# Patient Record
Sex: FEMALE | Race: BLACK OR AFRICAN AMERICAN | NOT HISPANIC OR LATINO | Employment: PART TIME | URBAN - METROPOLITAN AREA
[De-identification: names, ages, dates, MRNs, and addresses within clinical notes are randomized per-mention and may not be internally consistent; named-entity substitution may affect disease eponyms.]

---

## 2020-11-18 ENCOUNTER — ANCILLARY PROCEDURE (OUTPATIENT)
Dept: CARDIOLOGY | Facility: CLINIC | Age: 65
End: 2020-11-18
Attending: FAMILY MEDICINE
Payer: COMMERCIAL

## 2020-11-18 DIAGNOSIS — I48.91 ATRIAL FIBRILLATION, UNSPECIFIED TYPE (H): ICD-10-CM

## 2020-11-18 PROCEDURE — 0298T LEADLESS EKG MONITOR 3 TO 14 DAYS: CPT | Performed by: INTERNAL MEDICINE

## 2020-11-18 PROCEDURE — 93306 TTE W/DOPPLER COMPLETE: CPT | Performed by: INTERNAL MEDICINE

## 2020-11-18 PROCEDURE — 0296T LEADLESS EKG MONITOR 3 TO 14 DAYS: CPT

## 2020-11-18 NOTE — PROGRESS NOTES
Per Dr. Joyce Ernandez , patient to have 14 day monitor placed.  Diagnosis: Atrial Fib   Monitor placed: Yes  Patient Instructed: Yes  Patient verbalized understanding: Yes  Holter # [id number]  Placed :IG  Documented : IG

## 2021-09-28 ENCOUNTER — LAB REQUISITION (OUTPATIENT)
Dept: LAB | Facility: CLINIC | Age: 66
End: 2021-09-28
Payer: COMMERCIAL

## 2021-09-28 DIAGNOSIS — E11.65 TYPE 2 DIABETES MELLITUS WITH HYPERGLYCEMIA (H): ICD-10-CM

## 2021-09-28 PROCEDURE — 80061 LIPID PANEL: CPT | Mod: ORL | Performed by: FAMILY MEDICINE

## 2021-09-28 PROCEDURE — 82043 UR ALBUMIN QUANTITATIVE: CPT | Performed by: FAMILY MEDICINE

## 2021-09-29 LAB
CHOLEST SERPL-MCNC: 161 MG/DL
CREAT UR-MCNC: 11 MG/DL
FASTING STATUS PATIENT QL REPORTED: YES
HDLC SERPL-MCNC: 55 MG/DL
LDLC SERPL CALC-MCNC: 90 MG/DL
MICROALBUMIN UR-MCNC: <5 MG/L
MICROALBUMIN/CREAT UR: NORMAL MG/G{CREAT}
NONHDLC SERPL-MCNC: 106 MG/DL
TRIGL SERPL-MCNC: 80 MG/DL

## 2022-10-25 ENCOUNTER — APPOINTMENT (OUTPATIENT)
Dept: GENERAL RADIOLOGY | Facility: CLINIC | Age: 67
End: 2022-10-25
Attending: EMERGENCY MEDICINE

## 2022-10-25 ENCOUNTER — HOSPITAL ENCOUNTER (EMERGENCY)
Facility: CLINIC | Age: 67
Discharge: HOME | End: 2022-10-25
Attending: EMERGENCY MEDICINE | Admitting: EMERGENCY MEDICINE

## 2022-10-25 ENCOUNTER — APPOINTMENT (OUTPATIENT)
Dept: CARDIOLOGY | Facility: CLINIC | Age: 67
End: 2022-10-25
Attending: EMERGENCY MEDICINE

## 2022-10-25 VITALS
RESPIRATION RATE: 18 BRPM | HEART RATE: 123 BPM | OXYGEN SATURATION: 100 % | SYSTOLIC BLOOD PRESSURE: 130 MMHG | TEMPERATURE: 98.2 F | DIASTOLIC BLOOD PRESSURE: 78 MMHG

## 2022-10-25 DIAGNOSIS — I50.9 CONGESTIVE HEART FAILURE, UNSPECIFIED HF CHRONICITY, UNSPECIFIED HEART FAILURE TYPE (H): ICD-10-CM

## 2022-10-25 DIAGNOSIS — I48.0 PAROXYSMAL ATRIAL FIBRILLATION (H): ICD-10-CM

## 2022-10-25 DIAGNOSIS — I48.0 PAF (PAROXYSMAL ATRIAL FIBRILLATION) (H): Primary | ICD-10-CM

## 2022-10-25 LAB
ALBUMIN SERPL BCG-MCNC: 4.4 G/DL (ref 3.5–5.2)
ALP SERPL-CCNC: 54 U/L (ref 35–104)
ALT SERPL W P-5'-P-CCNC: 19 U/L (ref 10–35)
ANION GAP SERPL CALCULATED.3IONS-SCNC: 10 MMOL/L (ref 7–15)
AST SERPL W P-5'-P-CCNC: 18 U/L (ref 10–35)
BASOPHILS # BLD AUTO: 0.1 10E3/UL (ref 0–0.2)
BASOPHILS NFR BLD AUTO: 1 %
BILIRUB SERPL-MCNC: 0.6 MG/DL
BUN SERPL-MCNC: 17.4 MG/DL (ref 8–23)
CALCIUM SERPL-MCNC: 9.7 MG/DL (ref 8.8–10.2)
CHLORIDE SERPL-SCNC: 103 MMOL/L (ref 98–107)
CREAT SERPL-MCNC: 1.03 MG/DL (ref 0.51–0.95)
DEPRECATED HCO3 PLAS-SCNC: 26 MMOL/L (ref 22–29)
EOSINOPHIL # BLD AUTO: 0.1 10E3/UL (ref 0–0.7)
EOSINOPHIL NFR BLD AUTO: 2 %
ERYTHROCYTE [DISTWIDTH] IN BLOOD BY AUTOMATED COUNT: 14.6 % (ref 10–15)
GFR SERPL CREATININE-BSD FRML MDRD: 59 ML/MIN/1.73M2
GLUCOSE SERPL-MCNC: 174 MG/DL (ref 70–99)
HCT VFR BLD AUTO: 44.2 % (ref 35–47)
HGB BLD-MCNC: 13.8 G/DL (ref 11.7–15.7)
HOLD SPECIMEN: NORMAL
HOLD SPECIMEN: NORMAL
IMM GRANULOCYTES # BLD: 0 10E3/UL
IMM GRANULOCYTES NFR BLD: 0 %
LVEF ECHO: NORMAL
LYMPHOCYTES # BLD AUTO: 1.6 10E3/UL (ref 0.8–5.3)
LYMPHOCYTES NFR BLD AUTO: 35 %
MAGNESIUM SERPL-MCNC: 1.7 MG/DL (ref 1.7–2.3)
MCH RBC QN AUTO: 25 PG (ref 26.5–33)
MCHC RBC AUTO-ENTMCNC: 31.2 G/DL (ref 31.5–36.5)
MCV RBC AUTO: 80 FL (ref 78–100)
MONOCYTES # BLD AUTO: 0.5 10E3/UL (ref 0–1.3)
MONOCYTES NFR BLD AUTO: 10 %
NEUTROPHILS # BLD AUTO: 2.4 10E3/UL (ref 1.6–8.3)
NEUTROPHILS NFR BLD AUTO: 52 %
NRBC # BLD AUTO: 0 10E3/UL
NRBC BLD AUTO-RTO: 0 /100
NT-PROBNP SERPL-MCNC: 1294 PG/ML (ref 0–900)
PLATELET # BLD AUTO: 162 10E3/UL (ref 150–450)
POTASSIUM SERPL-SCNC: 4 MMOL/L (ref 3.4–5.3)
PROT SERPL-MCNC: 7.4 G/DL (ref 6.4–8.3)
RBC # BLD AUTO: 5.51 10E6/UL (ref 3.8–5.2)
SODIUM SERPL-SCNC: 139 MMOL/L (ref 136–145)
TROPONIN T SERPL HS-MCNC: 14 NG/L
TSH SERPL DL<=0.005 MIU/L-ACNC: 1.29 UIU/ML (ref 0.3–4.2)
WBC # BLD AUTO: 4.6 10E3/UL (ref 4–11)

## 2022-10-25 PROCEDURE — 93306 TTE W/DOPPLER COMPLETE: CPT

## 2022-10-25 PROCEDURE — 93010 ELECTROCARDIOGRAM REPORT: CPT | Performed by: EMERGENCY MEDICINE

## 2022-10-25 PROCEDURE — 93306 TTE W/DOPPLER COMPLETE: CPT | Mod: 26 | Performed by: STUDENT IN AN ORGANIZED HEALTH CARE EDUCATION/TRAINING PROGRAM

## 2022-10-25 PROCEDURE — 84443 ASSAY THYROID STIM HORMONE: CPT | Performed by: EMERGENCY MEDICINE

## 2022-10-25 PROCEDURE — 36415 COLL VENOUS BLD VENIPUNCTURE: CPT | Performed by: EMERGENCY MEDICINE

## 2022-10-25 PROCEDURE — 83735 ASSAY OF MAGNESIUM: CPT | Performed by: EMERGENCY MEDICINE

## 2022-10-25 PROCEDURE — 80053 COMPREHEN METABOLIC PANEL: CPT | Performed by: EMERGENCY MEDICINE

## 2022-10-25 PROCEDURE — 83880 ASSAY OF NATRIURETIC PEPTIDE: CPT | Performed by: EMERGENCY MEDICINE

## 2022-10-25 PROCEDURE — 71046 X-RAY EXAM CHEST 2 VIEWS: CPT

## 2022-10-25 PROCEDURE — 93005 ELECTROCARDIOGRAM TRACING: CPT | Performed by: EMERGENCY MEDICINE

## 2022-10-25 PROCEDURE — 99285 EMERGENCY DEPT VISIT HI MDM: CPT | Mod: 25 | Performed by: EMERGENCY MEDICINE

## 2022-10-25 PROCEDURE — 84484 ASSAY OF TROPONIN QUANT: CPT | Performed by: EMERGENCY MEDICINE

## 2022-10-25 PROCEDURE — 71046 X-RAY EXAM CHEST 2 VIEWS: CPT | Mod: 26 | Performed by: RADIOLOGY

## 2022-10-25 PROCEDURE — 85004 AUTOMATED DIFF WBC COUNT: CPT | Performed by: EMERGENCY MEDICINE

## 2022-10-25 RX ORDER — RIVAROXABAN 20 MG/1
TABLET, FILM COATED ORAL
Status: ON HOLD | COMMUNITY
Start: 2022-07-25 | End: 2023-03-08

## 2022-10-25 RX ORDER — METOPROLOL SUCCINATE 25 MG/1
25 TABLET, EXTENDED RELEASE ORAL DAILY
Qty: 30 TABLET | Refills: 0 | Status: SHIPPED | OUTPATIENT
Start: 2022-10-25 | End: 2023-01-05 | Stop reason: DRUGHIGH

## 2022-10-25 RX ORDER — LISINOPRIL 20 MG/1
20 TABLET ORAL DAILY
COMMUNITY
Start: 2022-06-11 | End: 2023-01-03

## 2022-10-25 RX ORDER — POTASSIUM CHLORIDE 1500 MG/1
40 TABLET, EXTENDED RELEASE ORAL
Status: CANCELLED | OUTPATIENT
Start: 2022-10-25

## 2022-10-25 RX ORDER — MAGNESIUM SULFATE HEPTAHYDRATE 40 MG/ML
2 INJECTION, SOLUTION INTRAVENOUS
Status: CANCELLED | OUTPATIENT
Start: 2022-10-25

## 2022-10-25 RX ORDER — LIDOCAINE 40 MG/G
CREAM TOPICAL
Status: CANCELLED | OUTPATIENT
Start: 2022-10-25

## 2022-10-25 RX ORDER — POTASSIUM CHLORIDE 1500 MG/1
20 TABLET, EXTENDED RELEASE ORAL
Status: CANCELLED | OUTPATIENT
Start: 2022-10-25

## 2022-10-25 ASSESSMENT — ACTIVITIES OF DAILY LIVING (ADL)
ADLS_ACUITY_SCORE: 35

## 2022-10-25 ASSESSMENT — ENCOUNTER SYMPTOMS
UNEXPECTED WEIGHT CHANGE: 0
ARTHRALGIAS: 1
SHORTNESS OF BREATH: 1
LIGHT-HEADEDNESS: 0

## 2022-10-25 NOTE — DISCHARGE INSTRUCTIONS
We will call you to arrange a cardioversion and will review the below instructions with you:    You are scheduled for a Transesophageal Echocardiogram followed by a Cardioversion at the [facility] ([address], [facility] [address], [phone]).     You will need to have a covid swab done prior to procedure.          - At-home, rapid antigen test:              - Perform within 1-2 days of procedure              - If negative, take a photo of the result or report the test result verbally on the day of procedure               - If positive, contact Vidya Rich at [phone]              - Where to find: For purchase at pharmacies, or you can order free tests at covid.gov/tests           - PCR (if you can't find or do a rapid antigen test, OR if you are staying overnight):              - Perform within 4 days of procedure                             - If being performed at an outside lab, result needs to be faxed to [phone].        Visitor Policy: Two visitors.    Follow these instructions:    1. Report to the GOLD waiting room in the [facility] hospital on: __________    2. Please do not eat anything for 8 hours prior to your procedure. You may have sips of water up until 2 hours prior to your arrival.    3. The morning of your procedure you may take your scheduled medications with a SIP of water. If you take diabetic medications or a diuretic, you may hold these.     4. You will receive medication that makes you sleepy; you will need a .    You should not make any legal decisions for 24 hours following discharge.       If you have further questions, please utilize [employer] to contact us.   If your question concerns the above instructions, contact:  Damaris Cohn RN   Electrophysiology Nurse Coordinator.  [phone]    If your question concerns the schedule/appointment times, contact:  FRITZ Stokes Procedure   [phone]      Take metoprolol as directed to treat atrial  fibrillation.    Take xarelto as prescribed. Do NOT miss any doses.    Return to the Emergency Department if you develop worsening shortness of breath, chest pain, fast heart rate, fever, coughing up blood, or fainting.

## 2022-10-25 NOTE — ED TRIAGE NOTES
Triage Assessment & Note:    BP (!) 164/105   Pulse 110   Temp 98.6  F (37  C) (Temporal)   Resp 16   SpO2 100%       Patient presents with: Pt with hx of afib comes ambulatory to triage from clinic with reports of SOB and heart palpitations.     Home Treatments/Remedies: home medications    Febrile / Afebrile: afebrile    Duration of C/o: 3 wks    Jana Morris RN  October 25, 2022

## 2022-10-25 NOTE — ED PROVIDER NOTES
ED Provider Note  [facility]      History     Chief Complaint   Patient presents with     Palpitations     Shortness of Breath     HPI  Ariana Palomino is a 67 year old female with past medical history of hypertension, hypercholesterolemia, poorly controlled type 2 diabetes and paroxysmal atrial fibrillation who presents in referral from her primary care clinic with complaint of shortness of breath and palpitations for the past 3 weeks.  Patient states she has had palpitations and shortness of breath especially with exertion or going up stairs for at least the past 3 weeks.  She takes Xarelto for paroxysmal atrial fibrillation and states that she takes this when she remembers.  She thinks she misses a few doses a month.  She denies chest pain or chest tightness.  She denies weight gain or leg swelling.  She denies lightheadedness or feeling like she is going to faint.  An EKG obtained in clinic prior to arrival shows atrial fibrillation.    Past Medical History  History reviewed. No pertinent past medical history.  History reviewed. No pertinent surgical history.  canagliflozin (INVOKANA) 100 MG tablet  metoprolol succinate ER (TOPROL XL) 25 MG 24 hr tablet  lisinopril (ZESTRIL) 20 MG tablet  metFORMIN (GLUCOPHAGE) 1000 MG tablet  XARELTO ANTICOAGULANT 20 MG TABS tablet      No Known Allergies  Family History  History reviewed. No pertinent family history.  Social History   Social History     Tobacco Use     Smoking status: Unknown   Substance Use Topics     Alcohol use: Not Currently     Drug use: Never      Past medical history, past surgical history, medications, allergies, family history, and social history were reviewed with the patient. No additional pertinent items.       Review of Systems   Constitutional: Negative for unexpected weight change.   Respiratory: Positive for shortness of breath.    Cardiovascular: Negative for chest pain and leg swelling.   Musculoskeletal: Positive for  arthralgias.   Neurological: Negative for syncope and light-headedness.   All other systems reviewed and are negative.    A complete review of systems was performed with pertinent positives and negatives noted in the HPI, and all other systems negative.    Physical Exam   BP: (!) 164/105  Pulse: 110  Temp: 98.6  F (37  C)  Resp: 16  SpO2: 100 %  Physical Exam  Vitals and nursing note reviewed.   Constitutional:       General: She is not in acute distress.     Appearance: Normal appearance. She is well-developed and well-nourished. She is obese. She is not ill-appearing or diaphoretic.   HENT:      Head: Normocephalic and atraumatic.      Nose: Nose normal.      Mouth/Throat:      Mouth: Mucous membranes are moist.   Eyes:      General: No scleral icterus.     Conjunctiva/sclera: Conjunctivae normal.   Cardiovascular:      Rate and Rhythm: Tachycardia present.   Pulmonary:      Effort: Pulmonary effort is normal. No respiratory distress.      Breath sounds: No stridor.   Abdominal:      General: There is no distension.   Musculoskeletal:         General: No deformity or signs of injury. Normal range of motion.      Cervical back: Normal range of motion and neck supple. No rigidity.      Right lower leg: No edema.      Left lower leg: No edema.   Skin:     General: Skin is warm and dry.      Coloration: Skin is not jaundiced.   Neurological:      General: No focal deficit present.      Mental Status: She is alert and oriented to person, place, and time.   Psychiatric:         Mood and Affect: Mood and affect and mood normal.         Behavior: Behavior normal.           ED Course      Procedures            EKG Interpretation:      Interpreted by Krystal Moise MD  Time reviewed: 1320  Symptoms at time of EKG: sob   Rhythm: atrial fibrillation - rapid  Rate: Tachycardia and 100-110  Axis: Normal  Ectopy: premature junctional contraction  Conduction: normal  ST Segments/ T Waves: Non-specific ST-T wave changes and T  wave inversion Lateral and Inferior  Q Waves: none  Comparison to prior: No old EKG available    Clinical Impression: atrial fibrillation                           Results for orders placed or performed during the hospital encounter of 10/25/22   XR Chest 2 Views     Status: None    Narrative    Chest 2 views    INDICATION: Short of breath    COMPARISON: None    FINDINGS: Mild cardiomegaly. Lungs and pulmonary vascularity appear  normal. Bony structures appear grossly intact.      Impression    IMPRESSION: Cardiomegaly    GROVER MORELOS MD         SYSTEM ID:  [id number]   Comprehensive metabolic panel     Status: Abnormal   Result Value Ref Range    Sodium 139 136 - 145 mmol/L    Potassium 4.0 3.4 - 5.3 mmol/L    Chloride 103 98 - 107 mmol/L    Carbon Dioxide (CO2) 26 22 - 29 mmol/L    Anion Gap 10 7 - 15 mmol/L    Urea Nitrogen 17.4 8.0 - 23.0 mg/dL    Creatinine 1.03 (H) 0.51 - 0.95 mg/dL    Calcium 9.7 8.8 - 10.2 mg/dL    Glucose 174 (H) 70 - 99 mg/dL    Alkaline Phosphatase 54 35 - 104 U/L    AST 18 10 - 35 U/L    ALT 19 10 - 35 U/L    Protein Total 7.4 6.4 - 8.3 g/dL    Albumin 4.4 3.5 - 5.2 g/dL    Bilirubin Total 0.6 <=1.2 mg/dL    GFR Estimate 59 (L) >60 mL/min/1.73m2   Troponin T, High Sensitivity     Status: Normal   Result Value Ref Range    Troponin T, High Sensitivity 14 <=14 ng/L   Magnesium     Status: Normal   Result Value Ref Range    Magnesium 1.7 1.7 - 2.3 mg/dL   TSH with free T4 reflex     Status: Normal   Result Value Ref Range    TSH 1.29 0.30 - 4.20 uIU/mL   Nt probnp inpatient (BNP)     Status: Abnormal   Result Value Ref Range    N terminal Pro BNP Inpatient 1,294 (H) 0 - 900 pg/mL   CBC with platelets and differential     Status: Abnormal   Result Value Ref Range    WBC Count 4.6 4.0 - 11.0 10e3/uL    RBC Count 5.51 (H) 3.80 - 5.20 10e6/uL    Hemoglobin 13.8 11.7 - 15.7 g/dL    Hematocrit 44.2 35.0 - 47.0 %    MCV 80 78 - 100 fL    MCH 25.0 (L) 26.5 - 33.0 pg    MCHC 31.2 (L) 31.5 -  36.5 g/dL    RDW 14.6 10.0 - 15.0 %    Platelet Count 162 150 - 450 10e3/uL    % Neutrophils 52 %    % Lymphocytes 35 %    % Monocytes 10 %    % Eosinophils 2 %    % Basophils 1 %    % Immature Granulocytes 0 %    NRBCs per 100 WBC 0 <1 /100    Absolute Neutrophils 2.4 1.6 - 8.3 10e3/uL    Absolute Lymphocytes 1.6 0.8 - 5.3 10e3/uL    Absolute Monocytes 0.5 0.0 - 1.3 10e3/uL    Absolute Eosinophils 0.1 0.0 - 0.7 10e3/uL    Absolute Basophils 0.1 0.0 - 0.2 10e3/uL    Absolute Immature Granulocytes 0.0 <=0.4 10e3/uL    Absolute NRBCs 0.0 10e3/uL   Extra Tube (Morral Draw)     Status: None    Narrative    The following orders were created for panel order Extra Tube (Morral Draw).  Procedure                               Abnormality         Status                     ---------                               -----------         ------                     Extra Blue Top Tube[[id number]]                              Final result               Extra Red Top Tube[[id number]]                               Final result                 Please view results for these tests on the individual orders.   Extra Blue Top Tube     Status: None   Result Value Ref Range    Hold Specimen JIC    Extra Red Top Tube     Status: None   Result Value Ref Range    Hold Specimen JIC    Echocardiogram Complete     Status: None   Result Value Ref Range    LVEF  50-55% (borderline)     Narrative    [id number]  [id number]  [id number]  [id number]^STEPHIE^MADDIE^DANISH     [facility]  Echocardiography Laboratory  [address] [place] [id number]     Name: YOCASTA ELAINE  MRN: [id number]  : 1955  Study Date: 10/25/2022 02:08 PM  Age: 67 yrs  Gender: Female  Patient Location: [facility]  Reason For Study: Atrial Fibrillation  Ordering Physician: MADDIE CARD  Performed By: Lidia Ramirez     BSA: 1.9 m2  Height: 64 in  Weight: 180  lb  ______________________________________________________________________________  Procedure  Complete Portable Echo Adult.  ______________________________________________________________________________  Interpretation Summary  Left ventricular function is decreased. The ejection fraction is 50-55%  (borderline).  Arrythmia and vxxh-td-dpns variation is present limiting accurate assessment  of the LV function, the LV function appears low normal.  Global right ventricular function is mildly reduced.  Severe left atrial enlargement is present.  IVC diameter and respiratory changes fall into an intermediate range  suggesting an RA pressure of 8 mmHg.     This study was compared with the study from 11/18/2020. The left ventricular  function has worsened.  ______________________________________________________________________________  Left Ventricle  Left ventricular size is normal. Arrythmia and vxio-by-vwlt variation is  present limiting accurate assessment of the LV function, the LV function  appears low normal. Left ventricular function is decreased. The ejection  fraction is 50-55% (borderline). Diastolic function not assessed due to  arrhythmia.     Right Ventricle  The right ventricle is normal size. Global right ventricular function is  mildly reduced.     Atria  Severe left atrial enlargement is present.     Mitral Valve  The mitral valve is normal. Mild mitral insufficiency is present.     Aortic Valve  Aortic valve is normal in structure and function. On Doppler interrogation,  there is no significant stenosis or regurgitation.     Tricuspid Valve  The tricuspid valve is normal. Mild tricuspid insufficiency is present. The  right ventricular systolic pressure is approximated at 22.7 mmHg plus the  right atrial pressure. Pulmonary artery systolic pressure is normal.     Pulmonic Valve  The pulmonic valve is normal.     Vessels  The aorta root is normal. The thoracic aorta is normal. IVC diameter  and  respiratory changes fall into an intermediate range suggesting an RA pressure  of 8 mmHg.     Pericardium  Trivial pericardial effusion is present.     Compared to Previous Study  This study was compared with the study from 2020 . The left ventricular  function has worsened.  ______________________________________________________________________________  MMode/2D Measurements & Calculations  IVSd: 0.98 cm  LVIDd: 4.7 cm  LVIDs: 3.4 cm  LVPWd: 1.0 cm  FS: 29.2 %  LV mass(C)d: 167.1 grams  LV mass(C)dI: 89.3 grams/m2  Ao root diam: 2.7 cm  asc Aorta Diam: 3.2 cm  LVOT diam: 2.0 cm  LVOT area: 3.1 cm2     EF(MOD-bp): 53.1 %  LA Volume (BP): 99.0 ml     LA Volume Index (BP): 52.9 ml/m2  RWT: 0.44     Doppler Measurements & Calculations  PA acc time: 0.11 sec  TR max joanna: 238.2 cm/sec  TR max P.7 mmHg     ______________________________________________________________________________  Report approved by: MD Kaveh Arellano 10/25/2022 03:16 PM         CBC with platelets differential     Status: Abnormal    Narrative    The following orders were created for panel order CBC with platelets differential.  Procedure                               Abnormality         Status                     ---------                               -----------         ------                     CBC with platelets and d...[[id number]]  Abnormal            Final result                 Please view results for these tests on the individual orders.     Medications - No data to display     Assessments & Plan (with Medical Decision Making)   Ariana Palomino is a 67 year old female with past medical history of hypertension, hypercholesterolemia, poorly controlled type 2 diabetes and paroxysmal atrial fibrillation who presents in referral from her primary care clinic with complaint of shortness of breath and palpitations for the past 3 weeks.     Ddx: Medication noncompliance, poorly controlled diabetes, new onset heart  failure, paroxysmal atrial fibrillation, persistent atrial fibrillation    Patient well-appearing and in no apparent distress.  Heart rate 110 with EKG from clinic showing atrial fibrillation.  Patient is intermittently compliant with her Xarelto and has had her current symptoms for the past 3 weeks which now includes shortness of breath in addition to the palpitations.  She does not appear volume overloaded and has no history of CHF.  Last ultrasound in the record was from 2020.  Patient is relatively stable and may be appropriate for discharge. However, would need more data on her current heart function to determine best management for her atrial fibrillation.  It does not look like she is on a rate controlling medication.  We will consult cardiology EP fellow and obtain a formal echocardiogram to help determine disposition and plan for management.     BNP elevated to 12,000, TSH normal, normal hemoglobin and white count.  Trope 14.  Creatinine 1.03.  CMP otherwise normal.  Chest x-ray showed cardiomegaly but clear lungs.  Echocardiogram showed EF of 50 to 55% which is slightly reduced compared to echo from 2020.  Global right ventricular function also mildly reduced.  Severe left atrial enlargement present.    3:47 PM electrophysiology called back after completing the consult.  They spoke with the patient about her options and she was in agreement with the following plan: Cardiology to call the patient to schedule close outpatient follow-up for an outpatient MARY and cardioversion.  They recommend she started taking metoprolol XL 25 mg daily.  I wrote a prescription for 1 month.  Cardiology also included follow-up instructions in her AVS.      Patient is comfortable with this plan for discharge and outpatient management.      I have reviewed the nursing notes. I have reviewed the findings, diagnosis, plan and need for follow up with the patient.    New Prescriptions    METOPROLOL SUCCINATE ER (TOPROL XL) 25 MG 24  HR TABLET    Take 1 tablet (25 mg) by mouth daily for 30 days       Final diagnoses:   Paroxysmal atrial fibrillation (H)   Congestive heart failure, unspecified HF chronicity, unspecified heart failure type (H)       --    [facility] EMERGENCY DEPARTMENT  10/25/2022     Krystal Moise MD  10/25/22 7501

## 2022-10-26 ENCOUNTER — HOSPITAL ENCOUNTER (OUTPATIENT)
Facility: CLINIC | Age: 67
End: 2022-10-26

## 2022-10-26 LAB
ATRIAL RATE - MUSE: 312 BPM
DIASTOLIC BLOOD PRESSURE - MUSE: NORMAL MMHG
INTERPRETATION ECG - MUSE: NORMAL
P AXIS - MUSE: NORMAL DEGREES
PR INTERVAL - MUSE: NORMAL MS
QRS DURATION - MUSE: 84 MS
QT - MUSE: 348 MS
QTC - MUSE: 459 MS
R AXIS - MUSE: 44 DEGREES
SYSTOLIC BLOOD PRESSURE - MUSE: NORMAL MMHG
T AXIS - MUSE: 186 DEGREES
VENTRICULAR RATE- MUSE: 105 BPM

## 2022-11-08 ENCOUNTER — LAB (OUTPATIENT)
Dept: LAB | Facility: CLINIC | Age: 67
End: 2022-11-08
Attending: RADIOLOGY
Payer: COMMERCIAL

## 2022-11-08 ENCOUNTER — ANESTHESIA (OUTPATIENT)
Dept: SURGERY | Facility: CLINIC | Age: 67
End: 2022-11-08
Payer: COMMERCIAL

## 2022-11-08 ENCOUNTER — HOSPITAL ENCOUNTER (OUTPATIENT)
Dept: CARDIOLOGY | Facility: CLINIC | Age: 67
Discharge: HOME | End: 2022-11-08
Attending: NURSE PRACTITIONER | Admitting: RADIOLOGY
Payer: COMMERCIAL

## 2022-11-08 ENCOUNTER — HOSPITAL ENCOUNTER (OUTPATIENT)
Facility: CLINIC | Age: 67
Discharge: HOME | End: 2022-11-08
Attending: RADIOLOGY | Admitting: RADIOLOGY
Payer: COMMERCIAL

## 2022-11-08 ENCOUNTER — ANESTHESIA EVENT (OUTPATIENT)
Dept: SURGERY | Facility: CLINIC | Age: 67
End: 2022-11-08
Payer: COMMERCIAL

## 2022-11-08 ENCOUNTER — APPOINTMENT (OUTPATIENT)
Dept: MEDSURG UNIT | Facility: CLINIC | Age: 67
End: 2022-11-08
Attending: RADIOLOGY
Payer: COMMERCIAL

## 2022-11-08 ENCOUNTER — HOSPITAL ENCOUNTER (EMERGENCY)
Facility: CLINIC | Age: 67
End: 2022-11-08
Payer: COMMERCIAL

## 2022-11-08 VITALS
DIASTOLIC BLOOD PRESSURE: 61 MMHG | SYSTOLIC BLOOD PRESSURE: 123 MMHG | OXYGEN SATURATION: 100 % | HEART RATE: 58 BPM | RESPIRATION RATE: 16 BRPM

## 2022-11-08 VITALS
OXYGEN SATURATION: 100 % | RESPIRATION RATE: 16 BRPM | SYSTOLIC BLOOD PRESSURE: 172 MMHG | DIASTOLIC BLOOD PRESSURE: 105 MMHG | HEART RATE: 125 BPM

## 2022-11-08 VITALS
DIASTOLIC BLOOD PRESSURE: 80 MMHG | HEART RATE: 52 BPM | SYSTOLIC BLOOD PRESSURE: 131 MMHG | OXYGEN SATURATION: 97 % | RESPIRATION RATE: 16 BRPM

## 2022-11-08 DIAGNOSIS — I48.0 PAF (PAROXYSMAL ATRIAL FIBRILLATION) (H): ICD-10-CM

## 2022-11-08 LAB
GLUCOSE BLDC GLUCOMTR-MCNC: 144 MG/DL (ref 70–99)
LVEF ECHO: NORMAL
MAGNESIUM SERPL-MCNC: 1.6 MG/DL (ref 1.7–2.3)
POTASSIUM SERPL-SCNC: 3.9 MMOL/L (ref 3.4–5.3)

## 2022-11-08 PROCEDURE — 93325 DOPPLER ECHO COLOR FLOW MAPG: CPT

## 2022-11-08 PROCEDURE — 84132 ASSAY OF SERUM POTASSIUM: CPT | Performed by: NURSE PRACTITIONER

## 2022-11-08 PROCEDURE — 250N000011 HC RX IP 250 OP 636: Performed by: NURSE PRACTITIONER

## 2022-11-08 PROCEDURE — 92960 CARDIOVERSION ELECTRIC EXT: CPT | Mod: GC | Performed by: INTERNAL MEDICINE

## 2022-11-08 PROCEDURE — 370N000017 HC ANESTHESIA TECHNICAL FEE, PER MIN

## 2022-11-08 PROCEDURE — 93320 DOPPLER ECHO COMPLETE: CPT | Mod: 26 | Performed by: INTERNAL MEDICINE

## 2022-11-08 PROCEDURE — 93005 ELECTROCARDIOGRAM TRACING: CPT

## 2022-11-08 PROCEDURE — 999N000054 HC STATISTIC EKG NON-CHARGEABLE

## 2022-11-08 PROCEDURE — 250N000011 HC RX IP 250 OP 636: Performed by: INTERNAL MEDICINE

## 2022-11-08 PROCEDURE — 999N000132 HC STATISTIC PP CARE STAGE 1

## 2022-11-08 PROCEDURE — 250N000009 HC RX 250: Performed by: NURSE ANESTHETIST, CERTIFIED REGISTERED

## 2022-11-08 PROCEDURE — 250N000009 HC RX 250: Performed by: INTERNAL MEDICINE

## 2022-11-08 PROCEDURE — 93325 DOPPLER ECHO COLOR FLOW MAPG: CPT | Mod: 26 | Performed by: INTERNAL MEDICINE

## 2022-11-08 PROCEDURE — 93312 ECHO TRANSESOPHAGEAL: CPT | Mod: 26 | Performed by: INTERNAL MEDICINE

## 2022-11-08 PROCEDURE — 99152 MOD SED SAME PHYS/QHP 5/>YRS: CPT | Performed by: INTERNAL MEDICINE

## 2022-11-08 PROCEDURE — 92960 CARDIOVERSION ELECTRIC EXT: CPT

## 2022-11-08 PROCEDURE — 93010 ELECTROCARDIOGRAM REPORT: CPT | Mod: 76 | Performed by: INTERNAL MEDICINE

## 2022-11-08 PROCEDURE — 83735 ASSAY OF MAGNESIUM: CPT | Performed by: NURSE PRACTITIONER

## 2022-11-08 PROCEDURE — 255N000002 HC RX 255 OP 636: Performed by: INTERNAL MEDICINE

## 2022-11-08 PROCEDURE — 82962 GLUCOSE BLOOD TEST: CPT

## 2022-11-08 PROCEDURE — 76376 3D RENDER W/INTRP POSTPROCES: CPT | Mod: 26 | Performed by: INTERNAL MEDICINE

## 2022-11-08 RX ORDER — SODIUM CHLORIDE 9 MG/ML
INJECTION, SOLUTION INTRAVENOUS CONTINUOUS PRN
Status: DISCONTINUED | OUTPATIENT
Start: 2022-11-08 | End: 2022-11-09 | Stop reason: HOSPADM

## 2022-11-08 RX ORDER — MAGNESIUM SULFATE HEPTAHYDRATE 40 MG/ML
2 INJECTION, SOLUTION INTRAVENOUS
Status: COMPLETED | OUTPATIENT
Start: 2022-11-08 | End: 2022-11-08

## 2022-11-08 RX ORDER — NALOXONE HYDROCHLORIDE 0.4 MG/ML
0.4 INJECTION, SOLUTION INTRAMUSCULAR; INTRAVENOUS; SUBCUTANEOUS
Status: DISCONTINUED | OUTPATIENT
Start: 2022-11-08 | End: 2022-11-09 | Stop reason: HOSPADM

## 2022-11-08 RX ORDER — LIDOCAINE 40 MG/G
CREAM TOPICAL
Status: DISCONTINUED | OUTPATIENT
Start: 2022-11-08 | End: 2022-11-09 | Stop reason: HOSPADM

## 2022-11-08 RX ORDER — NALOXONE HYDROCHLORIDE 0.4 MG/ML
0.2 INJECTION, SOLUTION INTRAMUSCULAR; INTRAVENOUS; SUBCUTANEOUS
Status: DISCONTINUED | OUTPATIENT
Start: 2022-11-08 | End: 2022-11-09 | Stop reason: HOSPADM

## 2022-11-08 RX ORDER — POTASSIUM CHLORIDE 750 MG/1
40 TABLET, EXTENDED RELEASE ORAL
Status: DISCONTINUED | OUTPATIENT
Start: 2022-11-08 | End: 2022-11-09 | Stop reason: HOSPADM

## 2022-11-08 RX ORDER — LIDOCAINE HYDROCHLORIDE 20 MG/ML
15 SOLUTION OROPHARYNGEAL ONCE
Status: COMPLETED | OUTPATIENT
Start: 2022-11-08 | End: 2022-11-08

## 2022-11-08 RX ORDER — FENTANYL CITRATE 50 UG/ML
25 INJECTION, SOLUTION INTRAMUSCULAR; INTRAVENOUS
Status: DISCONTINUED | OUTPATIENT
Start: 2022-11-08 | End: 2022-11-09 | Stop reason: HOSPADM

## 2022-11-08 RX ORDER — ACYCLOVIR 200 MG/1
9.5 CAPSULE ORAL
Status: DISCONTINUED | OUTPATIENT
Start: 2022-11-08 | End: 2022-11-09 | Stop reason: HOSPADM

## 2022-11-08 RX ORDER — FLUMAZENIL 0.1 MG/ML
0.2 INJECTION, SOLUTION INTRAVENOUS
Status: DISCONTINUED | OUTPATIENT
Start: 2022-11-08 | End: 2022-11-09 | Stop reason: HOSPADM

## 2022-11-08 RX ORDER — POTASSIUM CHLORIDE 750 MG/1
20 TABLET, EXTENDED RELEASE ORAL
Status: DISCONTINUED | OUTPATIENT
Start: 2022-11-08 | End: 2022-11-09 | Stop reason: HOSPADM

## 2022-11-08 RX ADMIN — TOPICAL ANESTHETIC 0.5 ML: 200 SPRAY DENTAL; PERIODONTAL at 10:33

## 2022-11-08 RX ADMIN — MIDAZOLAM 2 MG: 1 INJECTION INTRAMUSCULAR; INTRAVENOUS at 10:42

## 2022-11-08 RX ADMIN — MAGNESIUM SULFATE IN WATER 2 G: 40 INJECTION, SOLUTION INTRAVENOUS at 11:01

## 2022-11-08 RX ADMIN — FENTANYL CITRATE 50 MCG: 50 INJECTION, SOLUTION INTRAMUSCULAR; INTRAVENOUS at 10:43

## 2022-11-08 RX ADMIN — LIDOCAINE HYDROCHLORIDE 30 MG: 10 INJECTION, SOLUTION EPIDURAL; INFILTRATION; INTRACAUDAL; PERINEURAL at 11:36

## 2022-11-08 RX ADMIN — LIDOCAINE HYDROCHLORIDE 15 ML: 20 SOLUTION ORAL at 10:33

## 2022-11-08 RX ADMIN — PERFLUTREN 6 ML: 6.52 INJECTION, SUSPENSION INTRAVENOUS at 11:05

## 2022-11-08 ASSESSMENT — ACTIVITIES OF DAILY LIVING (ADL)
ADLS_ACUITY_SCORE: 35
ADLS_ACUITY_SCORE: 35

## 2022-11-08 ASSESSMENT — ENCOUNTER SYMPTOMS: DYSRHYTHMIAS: 1

## 2022-11-08 NOTE — PROGRESS NOTES
Pt discharged via wheelchair accompanied by RN and daughter. VSS. Denies pain. Discharge instructions reviewed with pt and daughter. Pt tolerated po intake, ambulated in hallway and voided.

## 2022-11-08 NOTE — DISCHARGE INSTRUCTIONS
GOING HOME AFTER YOUR TRANSESOPHAGEAL ECHOCARDIOGRAM AND CARDIOVERSION    FOR NEXT 24 HOURS:  An adult should stay with you.  Relax and take it easy.  DO NOT make any important legal decisions.  DO NOT drive or operate machines at home or at work.  DO NOT consume any alcohol today.  Resume your regular diet 2 HOURS after procedure @ __________ and drink plenty of fluids.  If your throat is sore, eat cold, bland, soft foods.  If you have any redness/skin sorness where the patches were placed, you may use aloe vera gel or 1% hydrocortisone cream to the skin (sold at drug stores)  Take Tylenol (Acetaminophen) per your provider's recommendations as needed to help relieve local pain.     CALL YOUR HEALTHCARE PROVIDER IF:  New pain or trouble swallowing  New stomach or chest pain  You develop nausea or vomiting  Fever above 100.6 F or greater  You develop hives or a rash or any unexplained itching  Have irregular heartbeat or fast pulse  Feel faint, dizzy, or lightheaded  Have chest pain with increased activity  Have bleeding issues from blood-thinning medicines (vomiting that looks like coffee grounds or contains blood OR black, bloody stools).    CALL 911 RIGHT AWAY IF YOU HAVE:  Pain in your chest, arm, shoulder, neck, or upper back  Shortness of breath  Loss of vision, speech, or strength or coordination in any body part  Weakness in your arm or leg  Uncontrolled bleeding  Feel unstable in any way     FOLLOW UP APPOINTMENT:    Follow up as scheduled with EP/Cardiology Provider     ADDITIONAL INFORMATION:    If you have any questions:        Call the Echo Lab Nurse at [phone], press 4 (Monday-Friday 7:00 am - 4:00 pm)        Call the hospital: [phone] and ask them to page [phone] (after 4:00 pm and on   weekends or holidays)      Cardiovascular Clinic:   [address] [place] [address]. [place] [address]  Your Care Team:  EP Cardiology   Telephone Number     Polina Ellis NP [phone]   Damaris Cohn  RN  Maxine Dos Santos RN  [phone]     For scheduling appts or procedures:    Vidya Rich   [phone]   For the Device Clinic (Pacemakers and ICD's)   RN's :   Angela Thurman  During business hours: [phone]     After business hours:   [phone]- select option 4 and ask for job code [phone].       As always, Thank you for trusting us with your health care needs!

## 2022-11-08 NOTE — PROGRESS NOTES
Polina Ellis from EP made aware of pt's HR dipping down in low 40s. Per Polina, pt to stop taking her metoprolol. Pt and daughter both made aware.

## 2022-11-08 NOTE — ANESTHESIA PREPROCEDURE EVALUATION
Anesthesia Pre-Procedure Evaluation    Patient: Ariana Palomino   MRN: [id number] : 1955        Procedure : Procedure(s):  ANESTHESIA, FOR CARDIOVERSION@1130          No past medical history on file.   No past surgical history on file.   No Known Allergies   Social History     Tobacco Use     Smoking status: Unknown     Smokeless tobacco: Not on file   Substance Use Topics     Alcohol use: Not Currently      Wt Readings from Last 1 Encounters:   No data found for Wt        Anesthesia Evaluation            ROS/MED HX  ENT/Pulmonary:       Neurologic:       Cardiovascular: Comment: Echo 10/25/2022    Interpretation Summary  Left ventricular function is decreased. The ejection fraction is 50-55%  (borderline).  Arrythmia and gvgu-gf-mvve variation is present limiting accurate assessment  of the LV function, the LV function appears low normal.  Global right ventricular function is mildly reduced.  Severe left atrial enlargement is present.  IVC diameter and respiratory changes fall into an intermediate range  suggesting an RA pressure of 8 mmHg.     This study was compared with the study from 2020. The left ventricular  function has worsened.    (+) Dyslipidemia hypertension-----dysrhythmias, a-fib, valvular problems/murmurs     METS/Exercise Tolerance:     Hematologic:       Musculoskeletal:       GI/Hepatic:       Renal/Genitourinary:       Endo:     (+) type II DM,     Psychiatric/Substance Use:       Infectious Disease:       Malignancy:       Other:               OUTSIDE LABS:  CBC:   Lab Results   Component Value Date    WBC 4.6 10/25/2022    HGB 13.8 10/25/2022    HCT 44.2 10/25/2022     10/25/2022     BMP:   Lab Results   Component Value Date     10/25/2022    POTASSIUM 4.0 10/25/2022    CHLORIDE 103 10/25/2022    CO2 26 10/25/2022    BUN 17.4 10/25/2022    CR 1.03 (H) 10/25/2022     (H) 10/25/2022     COAGS: No results found for: PTT, INR, FIBR  POC: No results found for: BGM, HCG,  HCGS  HEPATIC:   Lab Results   Component Value Date    ALBUMIN 4.4 10/25/2022    PROTTOTAL 7.4 10/25/2022    ALT 19 10/25/2022    AST 18 10/25/2022    ALKPHOS 54 10/25/2022    BILITOTAL 0.6 10/25/2022     OTHER:   Lab Results   Component Value Date    MCKAYLA 9.7 10/25/2022    MAG 1.7 10/25/2022    TSH 1.29 10/25/2022       Anesthesia Plan    ASA Status:  4   NPO Status:  NPO Appropriate    Anesthesia Type: General.     - Airway: Native airway   Induction: Intravenous.   Maintenance: Inhalation.        Consents    Anesthesia Plan(s) and associated risks, benefits, and realistic alternatives discussed. Questions answered and patient/representative(s) expressed understanding.    - Discussed:     - Discussed with:  Patient      - Extended Intubation/Ventilatory Support Discussed: Yes.      - Patient is DNR/DNI Status: No    Use of blood products discussed: Yes.     - Discussed with: Patient.     Postoperative Care    Pain management: IV analgesics.        Comments:                Christopher J. Behrens, MD

## 2022-11-08 NOTE — ANESTHESIA POSTPROCEDURE EVALUATION
Patient: Ariana Palomino    Procedure: Procedure(s):  ANESTHESIA, FOR CARDIOVERSION@1130       Anesthesia Type:  General    Note:  Disposition: Outpatient   Postop Pain Control: Uneventful            Sign Out: Well controlled pain   PONV: No   Neuro/Psych: Uneventful            Sign Out: Acceptable/Baseline neuro status   Airway/Respiratory: Uneventful            Sign Out: Acceptable/Baseline resp. status   CV/Hemodynamics: Uneventful            Sign Out: Acceptable CV status   Other NRE: NONE   DID A NON-ROUTINE EVENT OCCUR? No           Last vitals:  Vitals:    11/08/22 1215 11/08/22 1230 11/08/22 1245   BP: 121/80 127/66 123/61   Pulse: 65 (!) 46 58   Resp: 16 16 16   SpO2: 98% 100%        Electronically Signed By: Christopher J. Behrens, MD  November 8, 2022  12:55 PM

## 2022-11-08 NOTE — PROGRESS NOTES
Pt arrived in ECHO department  for scheduled MARY + DCCV.   Procedure explained, questions answered and consent signed. Discharge instructions discussed with patient.  Pt's throat sprayed at 1030, therefore pt will not be able to eat or drink until 2 hours after at 1230.  Informed pt of this time and encouraged to start with warm fluids and soft foods.    Pt tolerated procedure well, and was given a total of 50 mcg IV fentanyl and 2 mg IV versed for conscious sedation.  Pt denied throat or chest pain after MARY complete.   MARY probe 62 used for procedure.  No clots visualized on MARY so proceeded with DCCV.  Anesthesia gave pt 30 mg IV brevitol for sedation and pt was DCCV at 150 Joules to a SR/SB.  Patient monitored closely until awake and VSS. Patient transferred to 2A for recovery.   Report given to 2A RN.

## 2022-11-08 NOTE — DISCHARGE INSTRUCTIONS
GOING HOME AFTER YOUR CARDIOVERSION    FOR NEXT 24 HOURS:  An adult should stay with you.  Relax and take it easy.  DO NOT make any important legal decisions.  DO NOT drive or operate machines at home or at work.  Resume your regular diet and drink plenty of fluids.  If you have any redness/skin sorness where the patches were placed, you may use aloe vera gel or 1% hydrocortisone cream to the skin (sold at drug stores)  Take Tylenol (Acetaminophen) per your provider's recommendations as needed to help relieve local pain.     CALL YOUR HEALTHCARE PROVIDER IF:  You develop nausea or vomiting  You develop hives or a rash or any unexplained itching  Have irregular heartbeat or fast pulse  Feel faint, dizzy, or lightheaded  Have chest pain with increased activity  Have bleeding issues from blood-thinning medicines    CALL 911 RIGHT AWAY IF YOU HAVE:  Pain in your chest, arm, shoulder, neck, or upper back  Shortness of breath  Loss of vision, speech, or strength or coordination in any body part  Weakness in your arm or leg  Uncontrolled bleeding  Feel unstable in any way     FOLLOW UP APPOINTMENT:    Follow up as scheduled with EP/Cardiology Provider in 4 weeks    ADDITIONAL INFORMATION:    Special instruction: Stop taking metoprolol due to lower heart rate after your cardioversion.     Cardiovascular Clinic:   [address] [place] [address]. [place] [address]  Your Care Team:  EP Cardiology   Telephone Number     Polina Ellis NP [phone]   Damaris Dos Santos RN  [phone]     For scheduling appts or procedures:    Vidya Rich   [phone]   For the Device Clinic (Pacemakers and ICD's)   RN's :   Angela Thurman  During business hours: [phone]     After business hours:   [phone]- select option 4 and ask for job code [id number].       As always, Thank you for trusting us with your health care needs!

## 2022-11-08 NOTE — ANESTHESIA CARE TRANSFER NOTE
Patient: Ariana Palomino    Procedure: Procedure(s):  ANESTHESIA, FOR CARDIOVERSION@1130       Diagnosis: Paroxysmal atrial fibrillation (H) [I48.0]  Diagnosis Additional Information: No value filed.    Anesthesia Type:   General     Note:    Oropharynx: oropharynx clear of all foreign objects and spontaneously breathing  Level of Consciousness: awake  Oxygen Supplementation: nasal cannula  Level of Supplemental Oxygen (L/min / FiO2): 4  Independent Airway: airway patency satisfactory and stable  Dentition: dentition unchanged  Vital Signs Stable: post-procedure vital signs reviewed and stable  Report to RN Given: handoff report given  Destination: echo.  Comments: VSS, converted to SR, report given to RN.         Vitals:  Vitals Value Taken Time   /83    Temp     Pulse 50    Resp 14    SpO2 100        Electronically Signed By: MARK Leigh CRNA  November 8, 2022  11:40 AM

## 2022-11-08 NOTE — PROCEDURES
[facility]    Procedure: *Cardioversion    Date/Time: 11/8/2022 2:15 PM  Performed by: Gian Zuniga MD  Authorized by: Gian Zuniga MD       UNIVERSAL PROTOCOL   Site Marked: NA  Prior Images Obtained and Reviewed:  Yes  Required items: Required blood products, implants, devices and special equipment available    Patient identity confirmed:  Arm band, verbally with patient, provided demographic data and hospital-assigned identification number  Patient was reevaluated immediately before administering moderate or deep sedation or anesthesia  Confirmation Checklist:  Patient's identity using two indicators, relevant allergies, procedure was appropriate and matched the consent or emergent situation and correct equipment/implants were available  Time out: Immediately prior to the procedure a time out was called    Universal Protocol: the Joint Commission Universal Protocol was followed    Preparation: Patient was prepped and draped in usual sterile fashion       ANESTHESIA  Anesthesia was administered and monitored by anesthesiology.  See anesthesia documentation for details.    SEDATION  Patient Sedated: Yes    Vital signs: Vital signs monitored during sedation      PROCEDURE DETAILS  Cardioversion basis: elective  Pre-procedure rhythm: atrial fibrillation  Patient position: patient was placed in a supine position  Chest area: chest area exposed  Electrodes: pads  Electrodes placed: anterior-posterior  Number of attempts: 1    Details of Attempts:  150 J D C synchronized current was delivered for cardioversion   Post-procedure rhythm: normal sinus rhythm  Complications: no complications      PROCEDURE    Patient Tolerance:  Patient tolerated the procedure well with no immediate complications      ATTENDING ATTESTATION:  I was present for the entire procedure and agree with the above note.     Tiffanie Rodriguez MD, MS  Professor of Medicine  Cardiovascular Division

## 2022-11-08 NOTE — PROGRESS NOTES
Pt arrived on 2A, room # 6 after MARY/cardioversion. Alert and oriented. VSS, mayank (HR in low 40s-60s). Denies pain. Can start po at 12:30p. Daughter arrived at bedside.

## 2022-11-09 LAB
ATRIAL RATE - MUSE: 46 BPM
ATRIAL RATE - MUSE: 98 BPM
DIASTOLIC BLOOD PRESSURE - MUSE: NORMAL MMHG
DIASTOLIC BLOOD PRESSURE - MUSE: NORMAL MMHG
INTERPRETATION ECG - MUSE: NORMAL
INTERPRETATION ECG - MUSE: NORMAL
P AXIS - MUSE: NORMAL DEGREES
P AXIS - MUSE: NORMAL DEGREES
PR INTERVAL - MUSE: 158 MS
PR INTERVAL - MUSE: NORMAL MS
QRS DURATION - MUSE: 84 MS
QRS DURATION - MUSE: 92 MS
QT - MUSE: 332 MS
QT - MUSE: 502 MS
QTC - MUSE: 439 MS
QTC - MUSE: 484 MS
R AXIS - MUSE: 62 DEGREES
R AXIS - MUSE: 78 DEGREES
SYSTOLIC BLOOD PRESSURE - MUSE: NORMAL MMHG
SYSTOLIC BLOOD PRESSURE - MUSE: NORMAL MMHG
T AXIS - MUSE: 196 DEGREES
T AXIS - MUSE: 244 DEGREES
VENTRICULAR RATE- MUSE: 128 BPM
VENTRICULAR RATE- MUSE: 46 BPM

## 2023-01-03 ENCOUNTER — OFFICE VISIT (OUTPATIENT)
Dept: CARDIOLOGY | Facility: CLINIC | Age: 68
End: 2023-01-03
Payer: COMMERCIAL

## 2023-01-03 VITALS
DIASTOLIC BLOOD PRESSURE: 120 MMHG | SYSTOLIC BLOOD PRESSURE: 167 MMHG | WEIGHT: 204.8 LBS | HEART RATE: 137 BPM | OXYGEN SATURATION: 98 %

## 2023-01-03 DIAGNOSIS — I48.19 PERSISTENT ATRIAL FIBRILLATION (H): ICD-10-CM

## 2023-01-03 DIAGNOSIS — E11.65 UNCONTROLLED TYPE 2 DIABETES MELLITUS WITH HYPERGLYCEMIA (H): ICD-10-CM

## 2023-01-03 DIAGNOSIS — I10 BENIGN ESSENTIAL HYPERTENSION: Primary | ICD-10-CM

## 2023-01-03 PROCEDURE — 93000 ELECTROCARDIOGRAM COMPLETE: CPT | Performed by: INTERNAL MEDICINE

## 2023-01-03 PROCEDURE — 99205 OFFICE O/P NEW HI 60 MIN: CPT | Performed by: INTERNAL MEDICINE

## 2023-01-03 RX ORDER — LISINOPRIL 20 MG/1
20 TABLET ORAL 2 TIMES DAILY
Qty: 60 TABLET | Refills: 3 | Status: SHIPPED | OUTPATIENT
Start: 2023-01-03 | End: 2023-01-05 | Stop reason: DRUGHIGH

## 2023-01-03 RX ORDER — METOPROLOL SUCCINATE 25 MG/1
50 TABLET, EXTENDED RELEASE ORAL DAILY
Qty: 30 TABLET | Refills: 3 | Status: ON HOLD | OUTPATIENT
Start: 2023-01-03 | End: 2023-03-08

## 2023-01-03 NOTE — PROGRESS NOTES
I am delighted to see Ariana Palomino as a new patient in [facility] cardiology clinic for evaluation of atrial fibrillation.     History of Present Illness:  The patient is a 67 year old  Female with a h/o PAF who presented to ED 10/25/2022 with 3 weeks of palpitations and dyspnea. Found to be in AF with rates 110 bpm and Bp 164/105.  She was intermittently compliant with Xarelto, so was started on metoprolol XL 25 mg every day, encouraged to take Xarelto daily, and scheduled for outpatient MARY/CV 11/8/2022 to sinus rhythm. She said it lasted a day before she had recurrent palpitations and dyspnea with climbing stairs. She did not report to her PCP. She was told to stop metoprolol after the CV due to sinus mayank in the 40s.  She said she's been on metoprolol previously up to 50 bid and tolerated it well.      Past Medical History:  1. Atrial fibrillation, initially presented 11/3/2020 in setting of COVID+ with symptoms, spontaneously converted to sinus. Recurrent AF s/p MARY/CV 11/8/2022  2. Hypertension - her lisinopril was increased from 20 to 40 in Aug 2022 per PCP note, but she's still only taking 20 every day.  3. Diabetes type II - Invokana added in July 2022 but patient states she does not take it     Medications:   Xarelto 20 every day  Lisinopril 20 every day  Metformin 1000 bid    Allergies:  No Known Allergies    Physical examination  Vitals: 167/120,   Weight 92.9 kg    Constitutional: In general, the patient is in no acute distress. Appears tired and older than stated age.   Cardiovascular: Carotids +2/2 bilaterally without bruits.  No jugular venous distension. Irregularly irregular. Normal S1, S2. No murmur, rub, click, or gallop.   Extremities: Pulses are normal bilaterally throughout. Trace peripheral edema.  Respiratory: Clear to asculation.  No ronchi, wheezes, rales.  No dullness to percussion.     I have personally and independently reviewed the following:  Labs:   10/25/2022: BNP 1294, TSH  1.29, cr 1.03, K 4.0. hgb 13, plt 162K, A1C 8.2  9/28/2021: chol 161, LDL 90, TG 80, HDL 55    Echo 10/25/2022 (in AF): EF 50-55%, RA 8, no sig valve disease, AZEEM 52.9    EKG:   TODAY: AF   11/8/2022:  bpm (pre CV)  11/8/2022: sinus mayank 46 bpm with PACs (post CV)    Patch monitor 11/18-12/2/2020: sinus average 62 bpm, no AF    Assessment :  1. Recurrent atrial fibrillation, persistent, with RVR, not on any winsome blocking agents. Her AZEEM is 52.9, would be difficult to maintain sinus rhythm without antiarrhythmic drugs. Not sure about her medical compliance at this time, so I am reluctant to start AAD. Would attempt rate control - will start metoprolol succinate 50 every day. [id number] score is 4 for age, female, HTN, DM. On appropriate dose of Xarelto 20 every day.  2. Hypertension. Not controlled. Advised her to increase lisinopril to 20 bid as previously directed by her PCP.  3. Diabetes type II. A1C 8.2, she is not taking Invokana.    Plan:  I would like to see patient back next week to assess rate control, BP. Will also repeat BMP on higher dose of lisinopril.  I favor aggressive attempts at rate control at this time rather than antiarrhythmic drugs given her medical compliance issues.        I spent a total of 30 minutes face to face with  Ariana Palomino during today's office visit. I have spend an additional 30 minutes today on chart review and documentation.      The patient is to return as above . The patient understood the treatment plan as outlined above.  There were no barriers to learning.      Kristina Arizmendi MD

## 2023-01-03 NOTE — PATIENT INSTRUCTIONS
The following is a summary of your office visit today:    Atrial fibrillation    Begin Metoprolol succinate 50 mg once a day  Increase lisinopril to 20 mg twice a day  Keep taking Xarelto    Return next week with [facility]    Nurse contact information:  Cardiology Care Coordinators:  Cheryl Castillo RN and Giuliana Kaye, RN   Phone  [phone]    Fax [phone]       HOW TO CHECK YOUR BLOOD PRESSURE AT HOME:     Avoid eating, smoking, and exercising for at least 30 minutes before taking a reading.    Be sure you have taken your BP medication at least 2-3 hours before you check it.     Sit quietly for 10 minutes before a reading.     Sit in a chair with your feet flat on the floor. Rest your  arm on a table so that the arm cuff is at the same level as your heart.    Remain still during the reading.    Record your blood pressure and pulse in a log and bring to your next appointment.     If you have had any blood work, imaging or other testing completed we will be in touch within 1-2 weeks regarding the results. If you have any questions, concerns or need to schedule a follow up, please contact us at [phone]. If you are needing refills please contact your pharmacy. For urgent after hour care please call the [facility] at [phone] or the [facility] at [phone] and ask to speak to the cardiologist on call.    It was a pleasure meeting with you today. Please let us know if there is anything else we can do for you so that we can be sure you are leaving completely satisfied with your care experience.     Your Cardiology Team at [facility]  RN Care Coordinators: Nirali  Support Staff: Tosin

## 2023-01-03 NOTE — NURSING NOTE
Ariana Palomino's goals for this visit include:   Chief Complaint   Patient presents with     New Patient     Atrial Fib       She requests these members of her care team be copied on today's visit information: yes     PCP: Oma Cook    Referring Provider:  No referring provider defined for this encounter.    BP (!) 167/120 (BP Location: Left arm, Patient Position: Chair, Cuff Size: Adult Large)   Pulse (!) 137   Wt 92.9 kg (204 lb 12.8 oz)   SpO2 98%     Do you need any medication refills at today's visit? No         ZIGGY Ferguson   Cardiology Team  [facility]

## 2023-01-04 ENCOUNTER — TELEPHONE (OUTPATIENT)
Dept: CARDIOLOGY | Facility: CLINIC | Age: 68
End: 2023-01-04

## 2023-01-04 DIAGNOSIS — I48.0 PAROXYSMAL ATRIAL FIBRILLATION (H): ICD-10-CM

## 2023-01-04 DIAGNOSIS — I10 ESSENTIAL HYPERTENSION: Primary | ICD-10-CM

## 2023-01-04 NOTE — TELEPHONE ENCOUNTER
[facility] Call Center    Phone Message    May a detailed message be left on voicemail: yes     Reason for Call: Medication Question or concern regarding medication   Prescription Clarification  Name of Medication: lisinopril (ZESTRIL) 20 MG tablet  metoprolol succinate ER (TOPROL XL) 25 MG 24 hr tablet    Prescribing Provider: Dr Arizmendi   Pharmacy: [id number] [place] [facility] [address]      What on the order needs clarification? The insurance will only cover 1 pill a day, so Dr Arizmendi could send a higher dose or do a Prior Auth for both medications for the 2 pills a day       Action Taken: Other: Cardiology    Travel Screening: Not Applicable     Thank you!  [facility]

## 2023-01-05 RX ORDER — LISINOPRIL 40 MG/1
20 TABLET ORAL 2 TIMES DAILY
Qty: 90 TABLET | Refills: 3 | Status: ON HOLD | OUTPATIENT
Start: 2023-01-05 | End: 2023-03-08

## 2023-01-05 RX ORDER — METOPROLOL SUCCINATE 50 MG/1
50 TABLET, EXTENDED RELEASE ORAL DAILY
Qty: 90 TABLET | Refills: 3 | Status: SHIPPED | OUTPATIENT
Start: 2023-01-05 | End: 2023-01-10

## 2023-01-05 NOTE — TELEPHONE ENCOUNTER
Begin Metoprolol succinate 50 mg once a day  Increase lisinopril to 20 mg twice a day    Insurance not covering new directions sent by Dr Arizmendi on 1/3/23.  Changed metoprolol to a 50 mg tablet instead of two 20 mg tablets and changed lisinopril to 40 mg tablet for patient to cut in half and take 20 mg BID.   Sent to pharmacy.     Cheryl Castillo RN  Cardiology Care Coordinator  [facility]  Phone: [phone]

## 2023-01-06 ENCOUNTER — DOCUMENTATION ONLY (OUTPATIENT)
Dept: LAB | Facility: CLINIC | Age: 68
End: 2023-01-06

## 2023-01-06 DIAGNOSIS — I10 BENIGN ESSENTIAL HYPERTENSION: Primary | ICD-10-CM

## 2023-01-06 NOTE — PROGRESS NOTES
Ariana Palomino has an upcoming lab appointment:    Future Appointments   Date Time Provider Department Center   1/10/2023 10:40 AM [facility]   1/10/2023 11:00 AM [facility]     Patient is scheduled for the following lab(s): Dr. Arizmendi lab orders     There is no order available. Please review and place either future orders or HMPO (Review of Health Maintenance Protocol Orders), as appropriate.    Health Maintenance Due   Topic     A1C      ANNUAL REVIEW OF HM ORDERS      HEPATITIS C SCREENING      LIPID      MICROALBUMIN      Cielo Sauer

## 2023-01-10 ENCOUNTER — APPOINTMENT (OUTPATIENT)
Dept: LAB | Facility: CLINIC | Age: 68
End: 2023-01-10
Payer: COMMERCIAL

## 2023-01-10 ENCOUNTER — OFFICE VISIT (OUTPATIENT)
Dept: CARDIOLOGY | Facility: CLINIC | Age: 68
End: 2023-01-10
Payer: COMMERCIAL

## 2023-01-10 VITALS
DIASTOLIC BLOOD PRESSURE: 90 MMHG | OXYGEN SATURATION: 100 % | BODY MASS INDEX: 32.47 KG/M2 | WEIGHT: 202 LBS | HEART RATE: 90 BPM | HEIGHT: 66 IN | SYSTOLIC BLOOD PRESSURE: 114 MMHG

## 2023-01-10 DIAGNOSIS — I10 BENIGN ESSENTIAL HYPERTENSION: ICD-10-CM

## 2023-01-10 DIAGNOSIS — I48.0 PAROXYSMAL ATRIAL FIBRILLATION (H): ICD-10-CM

## 2023-01-10 LAB
ANION GAP SERPL CALCULATED.3IONS-SCNC: 3 MMOL/L (ref 3–14)
BUN SERPL-MCNC: 11 MG/DL (ref 7–30)
CALCIUM SERPL-MCNC: 9.2 MG/DL (ref 8.5–10.1)
CHLORIDE BLD-SCNC: 111 MMOL/L (ref 94–109)
CO2 SERPL-SCNC: 29 MMOL/L (ref 20–32)
CREAT SERPL-MCNC: 0.92 MG/DL (ref 0.52–1.04)
GFR SERPL CREATININE-BSD FRML MDRD: 67 ML/MIN/1.73M2
GLUCOSE BLD-MCNC: 222 MG/DL (ref 70–99)
POTASSIUM BLD-SCNC: 4.3 MMOL/L (ref 3.4–5.3)
SODIUM SERPL-SCNC: 143 MMOL/L (ref 133–144)

## 2023-01-10 PROCEDURE — 93000 ELECTROCARDIOGRAM COMPLETE: CPT | Performed by: INTERNAL MEDICINE

## 2023-01-10 PROCEDURE — 99207 PR SC NO CHARGE VISIT: CPT

## 2023-01-10 PROCEDURE — 36415 COLL VENOUS BLD VENIPUNCTURE: CPT | Performed by: INTERNAL MEDICINE

## 2023-01-10 PROCEDURE — 80048 BASIC METABOLIC PNL TOTAL CA: CPT | Performed by: INTERNAL MEDICINE

## 2023-01-10 PROCEDURE — 99215 OFFICE O/P EST HI 40 MIN: CPT | Mod: GC

## 2023-01-10 RX ORDER — METOPROLOL SUCCINATE 50 MG/1
100 TABLET, EXTENDED RELEASE ORAL DAILY
Qty: 90 TABLET | Refills: 3 | Status: ON HOLD | OUTPATIENT
Start: 2023-01-10 | End: 2023-03-06

## 2023-01-10 NOTE — NURSING NOTE
"Ariana Palomino's goals for this visit include: 1 week follow up  She requests these members of her care team be copied on today's visit information: NA    PCP: Oma Cook    Referring Provider:  No referring provider defined for this encounter.    BP (!) 114/90   Pulse 90   Ht 1.676 m (5' 6\")   Wt 91.6 kg (202 lb)   SpO2 100%   BMI 32.60 kg/m      Do you need any medication refills at today's visit? NO    Lien Ellsworth CMA (AAMA)      "

## 2023-01-10 NOTE — PROGRESS NOTES
ELECTROPHYSIOLOGY CLINIC VISIT    Assessment/Recommendations   Assessment/Plan:    Ariana Palomino is a 67 year old female who presents today for follow up of persistent AF with RVR and HTN.     Persistent Atrial Fibrillation with RVR   Started metoprolol succinate 50 mg last visit. AF vent rate today 115 (130 bpm previously). Increased metoprolol succinate to 50 mg BID. Will see patient in 1 month. If HR at that time is well controlled and pt feels well, we will update an echo to ensure her EF is remaining stable. If she is feeling tired, or HR is not well controlled will consider hospital admission for AAT with cardioversion. Given history of sinus bradycardia, would favor Dofetilide at that time.     Hypertension  Increased lisinopril to 20 mg BID as previously directed by PCP. BP today 114/90, upon retake 128/83. Updated BMP today, stable kidney function with increased dose. Will increase metoprolol succinate to 50 mg BID.    Diabetes type II   Invokana added in July 2022, pt reports she is not taking d/t some concerns she has about starting it. Encouraged follow up with PCP to discuss medication or other options.     Follow up in 1 month.        History of Present Illness/Subjective    MsChasity Palomino is a 67 year old female who comes in today for follow up of HTN and persistent AF.     The patient is a 67 year old  Female with a h/o PAF who presented to ED 10/25/2022 with 3 weeks of palpitations and dyspnea. Found to be in AF with rates 110 bpm and Bp 164/105.  She was intermittently compliant with Xarelto, so was started on metoprolol XL 25 mg every day, encouraged to take Xarelto daily, and scheduled for outpatient MARY/CV 11/8/2022 to sinus rhythm. She said it lasted a day before she had recurrent palpitations and dyspnea with climbing stairs. She did not report to her PCP. She was told to stop metoprolol after the CV due to sinus mayank in the 40s.  She said she's been on metoprolol previously up to 50 bid  and tolerated it well.    Last visit (1/3), presenting EKG with AF (130 bpm) and /120, lisinopril increased to 20 mg BID and started metoprolol succinate 50 mg daily.     She presents a week later for follow up. BP today 114/90, retake 128/83. EKG with AF rate of 115 bpm. States she did not start taking the metoprolol succinate until Friday (1/6). Reports she is taking the 40 mg of lisinopril at once before bed instead of BID. States she does feel better since starting metoprolol and increasing the lisinopril. She does endorse feeling tired, reports there was maybe slight improvement of this following the cardioversion when she was in SR for ~1 day. She denies chest discomfort, orthopnea, lightheadedness, dizziness, pre-syncope, or syncope.     I have reviewed and updated the patient's Past Medical History, Social History, Family History and Medication List.     Cardiographics (Personally Reviewed) :   MARY: 11/8/22   Interpretation Summary  Left ventricular function is decreased. The ejection fraction is 30-35%  (moderately reduced).  Global right ventricular function is mildly reduced.  Severe biatrial enlargement is present.   There is significant and organized spontaneous echo contrast in left atrial  appendage with a definitive thrombus. Doppler velocities are low at 20cm/sec.  The FLAVIO was thoroughly interogated with contrast and 3D.  No significant valvular abnoramlities are present.  Trivial pericardial effusion is present.    TTE: 10/25/22  nterpretation Summary  Left ventricular function is decreased. The ejection fraction is 50-55%  (borderline).  Arrythmia and hoye-cm-wbew variation is present limiting accurate assessment  of the LV function, the LV function appears low normal.  Global right ventricular function is mildly reduced.  Severe left atrial enlargement is present.  IVC diameter and respiratory changes fall into an intermediate range  suggesting an RA pressure of 8 mmHg.  LA Volume Index  "(BP): 52.9 ml/m2    EK/10/2023:  bpm   1/3/2023:  bpm   2022:  bpm (pre CV)  2022: sinus mayank 46 bpm with PACs (post CV)        Physical Examination   BP (!) 114/90   Pulse 90   Ht 1.676 m (5' 6\")   Wt 91.6 kg (202 lb)   SpO2 100%   BMI 32.60 kg/m    Wt Readings from Last 3 Encounters:   01/10/23 91.6 kg (202 lb)   23 92.9 kg (204 lb 12.8 oz)     General Appearance:   Alert, well-appearing and in no acute distress.   HEENT: Atraumatic, normocephalic.  MMM.   Chest/Lungs:   Respirations unlabored.  Lungs are clear to auscultation.   Cardiovascular:   Irregular irregular.  S1/S2. No murmur. No JVD.    Abdomen:  Soft, nontender, nondistended.   Extremities: No cyanosis or clubbing. No edema.    Musculoskeletal: Moves all extremities.     Skin: Warm, dry, intact.    Neurologic: Mood and affect are appropriate.  Alert and oriented to person, place, time, and situation.          Medications  Allergies   Lisinopril 20 mg BID   Xarelto 20 mg daily   Metformin 500 BID   Metoprolol succinate 50 mg daily  No Known Allergies      Lab Results (Personally Reviewed)    Chemistry/lipid CBC Cardiac Enzymes/BNP/TSH/INR   Lab Results   Component Value Date    BUN 11 01/10/2023     01/10/2023    CO2 29 01/10/2023     Creatinine   Date Value Ref Range Status   01/10/2023 0.92 0.52 - 1.04 mg/dL Final       Lab Results   Component Value Date    CHOL 161 2021    HDL 55 2021    LDL 90 2021      Lab Results   Component Value Date    WBC 4.6 10/25/2022    HGB 13.8 10/25/2022    HCT 44.2 10/25/2022    MCV 80 10/25/2022     10/25/2022    Lab Results   Component Value Date    TSH 1.29 10/25/2022        The patient states understanding and is agreeable with the plan.   This patient was seen and examined with Dr. ROBERT Arizmendi. The above note reflects our joint assessment and plan.     [place] PA-C  [facility]  Electrophysiology Consult " Service  Pager: [phone]

## 2023-01-10 NOTE — PATIENT INSTRUCTIONS
The following is a summary of your office visit today:      Atrial fibrillation:  Increase metoprolol to 100 mg at night  Keep taking all other medications    Return 1 month         If you have had any blood work, imaging or other testing completed we will be in touch within 1-2 weeks regarding the results. If you have any questions, concerns or need to schedule a follow up, please contact us at [phone] If you are needing refills please contact your pharmacy. For urgent after hour care please call the [facility] at [phone] or the [facility] at [phone] and ask to speak to the cardiologist on call.    It was a pleasure meeting with you today. Please let us know if there is anything else we can do for you so that we can be sure you are leaving completely satisfied with your care experience.     Your Cardiology Team at [facility]  Phone: [phone] Fax: [phone]  RN Care Coordinators: Nirali  Support Staff: Kane Dangelo            HOW TO CHECK YOUR BLOOD PRESSURE AT HOME:     Avoid eating, smoking, and exercising for at least 30 minutes before taking a reading.    Be sure you have taken your BP medication at least 2-3 hours before you check it.     Sit quietly for 10 minutes before a reading.     Sit in a chair with your feet flat on the floor. Rest your  arm on a table so that the arm cuff is at the same level as your heart.    Remain still during the reading.    Record your blood pressure and pulse in a log and bring to your next appointment.        Medical Assistant/Nurse notes reviewed and accepted.  Problem List Reviewed.  Skin system in problem list updated.    Digital photo(s) obtained with patient consent.       MOHS MICROGRAPHIC SURGERY    PROCEDURE: 1 of 1    Surgeon: Ronni Burch MD  Scrub nurse: Yadi Pham MA  Lesion: 0.9 cm.  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  Final defect: 1.4 cm. by 1.2 cm.  Postop Diagnosis: Same  Preparation of skin: Betadine scrub and Sterile Water  Preparatory medications: none    Anesthesia: 6 cc of 1% Lidocaine with 1:100,000 epinephrine and 3 cc 0.25% Marcaine    Patient feels well today and wishes to proceed with surgery.    STAGE I, LAYER I:  The nature and purpose of the procedure, associated risks, possible consequences, complications, and alternative methods of treatment were explained to the patient in detail by Dr. Burch. An informed operative consent was obtained. The patient was positioned, prepped, and draped in the usual sterile manner. The clinically apparent tumor was marked with Gentian violet along its outer border.  Local anesthesia was then obtained by infiltrating with the agents mentioned above.  The center of the clinically apparent tumor was lightly debulked with a curette.    STAGE I, LAYER 2:   An additional 2-3 mm rim of normal appearing tissue was marked circumferentially around the residual area(s) of skin cancer.   A 15c scalpel blade is used to make a small nick at the twelve o'clock position, perpendicular to and crossing the circumferential marking around the skin cancer. The area(s) thus outlined was/were excised deep to the mid portion of the subcutaneous tissue. Hemostasis was obtained with Surgi-stat  electrocoagulation. The specimen was oriented, subdivided into 1 section(s), chromacoded, and submitted for horizontal frozen sections. The patient tolerated the procedure well and there were no complications noted. On review of the horizontal frozen sections of  Stage 1, Layer 2, no residual tumor is identified.       The total number of microscopic sections was 1    CLOSURE OF DEFECT:  Closure options were discussed with the patient.  Type: Refer to plastic surgery for closure    DRESSING: The wound was dressed with a no ointment, Adaptic, gauze, Hypafix pressure type bandage. Wound care instructions given and follow-up arranged.    DIAGNOSIS: squamous cell carcinoma status post Mohs' surgery.      The patient tolerated the procedures well and left the dermatology clinic in good condition.  Home care instructions given and reviewed with patient.    Clinical photographs obtained with patient's consent.

## 2023-02-14 ENCOUNTER — OFFICE VISIT (OUTPATIENT)
Dept: CARDIOLOGY | Facility: CLINIC | Age: 68
End: 2023-02-14
Payer: COMMERCIAL

## 2023-02-14 VITALS
OXYGEN SATURATION: 97 % | HEART RATE: 59 BPM | DIASTOLIC BLOOD PRESSURE: 80 MMHG | BODY MASS INDEX: 33.06 KG/M2 | WEIGHT: 204.8 LBS | SYSTOLIC BLOOD PRESSURE: 136 MMHG

## 2023-02-14 DIAGNOSIS — I10 BENIGN ESSENTIAL HYPERTENSION: ICD-10-CM

## 2023-02-14 DIAGNOSIS — I48.19 PERSISTENT ATRIAL FIBRILLATION (H): Primary | ICD-10-CM

## 2023-02-14 PROCEDURE — 93000 ELECTROCARDIOGRAM COMPLETE: CPT | Performed by: INTERNAL MEDICINE

## 2023-02-14 PROCEDURE — 99214 OFFICE O/P EST MOD 30 MIN: CPT | Performed by: INTERNAL MEDICINE

## 2023-02-14 NOTE — NURSING NOTE
Ariana Palomino's goals for this visit include:   Chief Complaint   Patient presents with     Follow Up     1 month follow up        She requests these members of her care team be copied on today's visit information: yes     PCP: Oma Cook    Referring Provider:  No referring provider defined for this encounter.    /80 (BP Location: Left arm, Patient Position: Chair, Cuff Size: Adult Large)   Pulse 59   Wt 92.9 kg (204 lb 12.8 oz)   SpO2 97%   BMI 33.06 kg/m      Do you need any medication refills at today's visit? No       ZIGGY Ferguson   Cardiology Team  [facility]

## 2023-02-14 NOTE — PATIENT INSTRUCTIONS
The following is a summary of your office visit today:    Atrial fibrillation    Recommend: hospital admission for 3-4 days for medicine (dofetilide) and cardioversion    Monday March 6 to Thursday March 9    Let me know if those dates work    Nurse contact information:  Cardiology Care Coordinators:  Cheryl Castillo, RN and Giuliana Kaye, RN   Phone  [phone]    Fax [phone]       HOW TO CHECK YOUR BLOOD PRESSURE AT HOME:     Avoid eating, smoking, and exercising for at least 30 minutes before taking a reading.    Be sure you have taken your BP medication at least 2-3 hours before you check it.     Sit quietly for 10 minutes before a reading.     Sit in a chair with your feet flat on the floor. Rest your  arm on a table so that the arm cuff is at the same level as your heart.    Remain still during the reading.    Record your blood pressure and pulse in a log and bring to your next appointment.     If you have had any blood work, imaging or other testing completed we will be in touch within 1-2 weeks regarding the results. If you have any questions, concerns or need to schedule a follow up, please contact us at [phone]. If you are needing refills please contact your pharmacy. For urgent after hour care please call the [facility] at [phone] or the [facility] at [phone] and ask to speak to the cardiologist on call.    It was a pleasure meeting with you today. Please let us know if there is anything else we can do for you so that we can be sure you are leaving completely satisfied with your care experience.     Your Cardiology Team at [facility]  RN Care Coordinators: Kg  Support Staff: Tosin

## 2023-02-14 NOTE — PROGRESS NOTES
I am delighted to see Ariana Palomino at the [place] cardiology clinic for follow up of atrial fibrillation.     The patient is a 67 year old  Female whom I met 1/3/2023. She has a h/o atrial fibrillation who presented to ED 10/25/2022 with 3 weeks of palpitations and dyspnea. Found to be in AF with rates 110 bpm and Bp 164/105.  She was intermittently compliant with Xarelto, so was started on metoprolol XL 25 mg every day, encouraged to take Xarelto daily, and scheduled for outpatient MARY/CV 11/8/2022 to sinus rhythm. She said it lasted a day before she had recurrent palpitations and dyspnea with climbing stairs. She did not report to her PCP. She was told to stop metoprolol after the CV due to sinus mayank in the 40s.  She said she's been on metoprolol previously up to 50 bid and tolerated it well.    Her echocardiogram showed LA volume index of 53; it would be challenging to maintain sinus rhythm without antiarrhythmic drugs. I was also unsure about her medical compliance.Her HR when I met her on 1/3/2023 was 137 bpm in AF.  I resumed metoprolol succinate 50 every day, and increased lisinopril for better BP control. She returned 1/10/2023 for reevaluation and at that time HR was 115 which was better. I increased metoprolol to 100 every day. Discussed antiarrhythmic drugs with her if rate control cannot be adequately achieved.    She tells me that she continues to feel some fluttering in her chest with dyspnea particularly with activity. She has been compliant with medications including Xarelto.     Past Medical History:  1. Atrial fibrillation, initially presented 11/3/2020 in setting of COVID+ with symptoms, spontaneously converted to sinus. Recurrent AF s/p MARY/CV 11/8/2022  2. Hypertension - her lisinopril was increased from 20 to 40 in Aug 2022 per PCP note, but she's still only taking 20 every day.  3. Diabetes type II - Invokana added in July 2022 but patient states she does not take it      Allergies:  No  Known Allergies    Medications:   Metoprolol succinate 100 qd  Xarelto 20 every day  Lisinopril 40 every day  Metformin 1000 bid      Physical examination  Vitals: /80 (BP Location: Left arm, Patient Position: Chair, Cuff Size: Adult Large)   Pulse 59   Wt 92.9 kg (204 lb 12.8 oz)   SpO2 97%   BMI 33.06 kg/m    BMI= Body mass index is 33.06 kg/m .    Constitutional: In general, the patient is a pleasant female in no acute distress.    Targeted cardiovascular exam:  Regular rate and rhythm. Normal S1, S2. No murmur, rub, click, or gallop.   Extremities:Pulses are normal bilaterally throughout. No peripheral edema.  Respiratory: Clear to asculation.      I have personally and independently reviewed the following:  Labs:   1/10/2023: cr 0.92  10/25/2022: BNP 1294, TSH 1.29, cr 1.03, K 4.0. hgb 13, plt 162K, A1C 8.2  9/28/2021: chol 161, LDL 90, TG 80, HDL 55     Echo 10/25/2022 (in AF): EF 50-55%, RA 8, no sig valve disease, AZEEM 52.9     EKG:   TODAY 2/14/2023:  bpm, QTc 450 ms  1/10/2023:  bpm  11/8/2022:  bpm (pre CV)  11/8/2022: sinus mayank 46 bpm with PACs (post CV)     Patch monitor 11/18-12/2/2020: sinus average 62 bpm, no AF    Assessment :  Persistent atrial fibrillation with RVR and symptoms of palpitations and dyspnea.  BP and HR much improved but she continues to have symptoms.  Discuss antiarrhythmic drug and cardioversion with her and she is willing to proceed.  Given sinus mayakn post CV, consider dofetilide which will require in hospital admission.        Plan:  Consider hospital admission March 6 for dofetilide, followed by cardioversion if needed.  Will reduce or stop metoprolol when dofetilide is started.  Continue Xarelto without interruption.    She will notify me if the dates are acceptable for her and her family.        I spent a total of 20 minutes face to face with  Ariana Palomino during today's office visit. I have spent an additional 15 minutes today on chart review  and documentation.    The patient is to return  as above. The patient understood the treatment plan as outlined above.  There were no barriers to learning.      Kristina Arizmendi MD

## 2023-02-22 ENCOUNTER — TELEPHONE (OUTPATIENT)
Dept: CARDIOLOGY | Facility: CLINIC | Age: 68
End: 2023-02-22
Payer: COMMERCIAL

## 2023-02-22 NOTE — TELEPHONE ENCOUNTER
I'd like to admit her for dofetilide and cardioversion - I offered March 6 for admit since I am on service that week. She is going to discuss schedule with family and let us know.   Cheryl can you follow up in 1-2 weeks to see if that date is ok with her.       Called and left message for patient regarding admission to hospital. Left call back number, will try her again.      Cheryl Castillo RN  Cardiology Care Coordinator  [facility]  Phone: [phone]

## 2023-02-23 NOTE — TELEPHONE ENCOUNTER
Called and spoke to patient, she spoke to her family and is ok with admission on March 6 th . Will arrange and call her if problems. Otherwise she was told to report to [facility] at 1:00 pm on March 6th admitting.     Cheryl Castillo, RN  Cardiology Care Coordinator  [facility]  Phone: [phone]

## 2023-03-06 ENCOUNTER — HOSPITAL ENCOUNTER (INPATIENT)
Facility: CLINIC | Age: 68
LOS: 2 days | Discharge: HOME | End: 2023-03-08
Attending: INTERNAL MEDICINE | Admitting: INTERNAL MEDICINE
Payer: COMMERCIAL

## 2023-03-06 ENCOUNTER — TELEPHONE (OUTPATIENT)
Dept: CARDIOLOGY | Facility: CLINIC | Age: 68
End: 2023-03-06
Payer: COMMERCIAL

## 2023-03-06 DIAGNOSIS — I10 ESSENTIAL HYPERTENSION: ICD-10-CM

## 2023-03-06 DIAGNOSIS — I48.0 PAROXYSMAL ATRIAL FIBRILLATION (H): ICD-10-CM

## 2023-03-06 DIAGNOSIS — I50.21 ACUTE HFREF (HEART FAILURE WITH REDUCED EJECTION FRACTION) (H): Primary | ICD-10-CM

## 2023-03-06 PROBLEM — Z51.81 ENCOUNTER FOR MONITORING DOFETILIDE THERAPY: Status: ACTIVE | Noted: 2023-03-06

## 2023-03-06 PROBLEM — Z79.899 ENCOUNTER FOR MONITORING DOFETILIDE THERAPY: Status: ACTIVE | Noted: 2023-03-06

## 2023-03-06 LAB
ANION GAP SERPL CALCULATED.3IONS-SCNC: 11 MMOL/L (ref 7–15)
BUN SERPL-MCNC: 21.2 MG/DL (ref 8–23)
CALCIUM SERPL-MCNC: 9.2 MG/DL (ref 8.8–10.2)
CHLORIDE SERPL-SCNC: 101 MMOL/L (ref 98–107)
CREAT SERPL-MCNC: 1.08 MG/DL (ref 0.51–0.95)
DEPRECATED HCO3 PLAS-SCNC: 24 MMOL/L (ref 22–29)
ERYTHROCYTE [DISTWIDTH] IN BLOOD BY AUTOMATED COUNT: 15.2 % (ref 10–15)
GFR SERPL CREATININE-BSD FRML MDRD: 55 ML/MIN/1.73M2
GLUCOSE BLDC GLUCOMTR-MCNC: 357 MG/DL (ref 70–99)
GLUCOSE BLDC GLUCOMTR-MCNC: 374 MG/DL (ref 70–99)
GLUCOSE SERPL-MCNC: 481 MG/DL (ref 70–99)
HBA1C MFR BLD: 13 %
HCT VFR BLD AUTO: 46.8 % (ref 35–47)
HGB BLD-MCNC: 14.3 G/DL (ref 11.7–15.7)
MAGNESIUM SERPL-MCNC: 1.9 MG/DL (ref 1.7–2.3)
MCH RBC QN AUTO: 24.8 PG (ref 26.5–33)
MCHC RBC AUTO-ENTMCNC: 30.6 G/DL (ref 31.5–36.5)
MCV RBC AUTO: 81 FL (ref 78–100)
PLATELET # BLD AUTO: 132 10E3/UL (ref 150–450)
POTASSIUM SERPL-SCNC: 4 MMOL/L (ref 3.4–5.3)
RBC # BLD AUTO: 5.76 10E6/UL (ref 3.8–5.2)
SARS-COV-2 RNA RESP QL NAA+PROBE: POSITIVE
SODIUM SERPL-SCNC: 136 MMOL/L (ref 136–145)
WBC # BLD AUTO: 3.5 10E3/UL (ref 4–11)

## 2023-03-06 PROCEDURE — U0003 INFECTIOUS AGENT DETECTION BY NUCLEIC ACID (DNA OR RNA); SEVERE ACUTE RESPIRATORY SYNDROME CORONAVIRUS 2 (SARS-COV-2) (CORONAVIRUS DISEASE [COVID-19]), AMPLIFIED PROBE TECHNIQUE, MAKING USE OF HIGH THROUGHPUT TECHNOLOGIES AS DESCRIBED BY CMS-2020-01-R: HCPCS | Performed by: NURSE PRACTITIONER

## 2023-03-06 PROCEDURE — 250N000013 HC RX MED GY IP 250 OP 250 PS 637: Performed by: STUDENT IN AN ORGANIZED HEALTH CARE EDUCATION/TRAINING PROGRAM

## 2023-03-06 PROCEDURE — 83735 ASSAY OF MAGNESIUM: CPT | Performed by: NURSE PRACTITIONER

## 2023-03-06 PROCEDURE — 250N000012 HC RX MED GY IP 250 OP 636 PS 637: Performed by: NURSE PRACTITIONER

## 2023-03-06 PROCEDURE — 214N000001 HC R&B CCU UMMC

## 2023-03-06 PROCEDURE — 99223 1ST HOSP IP/OBS HIGH 75: CPT | Mod: FS | Performed by: NURSE PRACTITIONER

## 2023-03-06 PROCEDURE — 85027 COMPLETE CBC AUTOMATED: CPT | Performed by: NURSE PRACTITIONER

## 2023-03-06 PROCEDURE — 36415 COLL VENOUS BLD VENIPUNCTURE: CPT | Performed by: NURSE PRACTITIONER

## 2023-03-06 PROCEDURE — 83036 HEMOGLOBIN GLYCOSYLATED A1C: CPT | Performed by: NURSE PRACTITIONER

## 2023-03-06 PROCEDURE — 93005 ELECTROCARDIOGRAM TRACING: CPT

## 2023-03-06 PROCEDURE — 999N000128 HC STATISTIC PERIPHERAL IV START W/O US GUIDANCE

## 2023-03-06 PROCEDURE — 250N000013 HC RX MED GY IP 250 OP 250 PS 637: Performed by: NURSE PRACTITIONER

## 2023-03-06 PROCEDURE — 93010 ELECTROCARDIOGRAM REPORT: CPT | Mod: 76 | Performed by: INTERNAL MEDICINE

## 2023-03-06 PROCEDURE — 80048 BASIC METABOLIC PNL TOTAL CA: CPT | Performed by: NURSE PRACTITIONER

## 2023-03-06 PROCEDURE — 250N000013 HC RX MED GY IP 250 OP 250 PS 637: Performed by: INTERNAL MEDICINE

## 2023-03-06 RX ORDER — ONDANSETRON 4 MG/1
4 TABLET, ORALLY DISINTEGRATING ORAL EVERY 6 HOURS PRN
Status: DISCONTINUED | OUTPATIENT
Start: 2023-03-06 | End: 2023-03-06

## 2023-03-06 RX ORDER — METOPROLOL SUCCINATE 50 MG/1
50 TABLET, EXTENDED RELEASE ORAL DAILY
Status: DISCONTINUED | OUTPATIENT
Start: 2023-03-07 | End: 2023-03-08 | Stop reason: HOSPADM

## 2023-03-06 RX ORDER — LISINOPRIL 40 MG/1
40 TABLET ORAL DAILY
Status: DISCONTINUED | OUTPATIENT
Start: 2023-03-06 | End: 2023-03-06

## 2023-03-06 RX ORDER — MAGNESIUM OXIDE 400 MG/1
400 TABLET ORAL EVERY 4 HOURS
Status: COMPLETED | OUTPATIENT
Start: 2023-03-06 | End: 2023-03-07

## 2023-03-06 RX ORDER — ACETAMINOPHEN 325 MG/1
650 TABLET ORAL EVERY 4 HOURS PRN
Status: DISCONTINUED | OUTPATIENT
Start: 2023-03-06 | End: 2023-03-08 | Stop reason: HOSPADM

## 2023-03-06 RX ORDER — DOFETILIDE 0.25 MG/1
250 CAPSULE ORAL EVERY 12 HOURS SCHEDULED
Status: DISCONTINUED | OUTPATIENT
Start: 2023-03-06 | End: 2023-03-07

## 2023-03-06 RX ORDER — LISINOPRIL 40 MG/1
40 TABLET ORAL DAILY
Status: DISCONTINUED | OUTPATIENT
Start: 2023-03-06 | End: 2023-03-08 | Stop reason: HOSPADM

## 2023-03-06 RX ORDER — DEXTROSE MONOHYDRATE 25 G/50ML
25-50 INJECTION, SOLUTION INTRAVENOUS
Status: DISCONTINUED | OUTPATIENT
Start: 2023-03-06 | End: 2023-03-08 | Stop reason: HOSPADM

## 2023-03-06 RX ORDER — DOFETILIDE 0.5 MG/1
500 CAPSULE ORAL EVERY 12 HOURS SCHEDULED
Status: DISCONTINUED | OUTPATIENT
Start: 2023-03-06 | End: 2023-03-06

## 2023-03-06 RX ORDER — ONDANSETRON 2 MG/ML
4 INJECTION INTRAMUSCULAR; INTRAVENOUS EVERY 6 HOURS PRN
Status: DISCONTINUED | OUTPATIENT
Start: 2023-03-06 | End: 2023-03-06

## 2023-03-06 RX ORDER — METOPROLOL SUCCINATE 50 MG/1
100 TABLET, EXTENDED RELEASE ORAL DAILY
Status: DISCONTINUED | OUTPATIENT
Start: 2023-03-06 | End: 2023-03-06

## 2023-03-06 RX ORDER — ACETAMINOPHEN 650 MG/1
650 SUPPOSITORY RECTAL EVERY 4 HOURS PRN
Status: DISCONTINUED | OUTPATIENT
Start: 2023-03-06 | End: 2023-03-08 | Stop reason: HOSPADM

## 2023-03-06 RX ORDER — AMOXICILLIN 250 MG
1 CAPSULE ORAL 2 TIMES DAILY PRN
Status: DISCONTINUED | OUTPATIENT
Start: 2023-03-06 | End: 2023-03-08 | Stop reason: HOSPADM

## 2023-03-06 RX ORDER — LIDOCAINE 40 MG/G
CREAM TOPICAL
Status: DISCONTINUED | OUTPATIENT
Start: 2023-03-06 | End: 2023-03-08 | Stop reason: HOSPADM

## 2023-03-06 RX ORDER — NITROGLYCERIN 0.4 MG/1
0.4 TABLET SUBLINGUAL EVERY 5 MIN PRN
Status: DISCONTINUED | OUTPATIENT
Start: 2023-03-06 | End: 2023-03-08 | Stop reason: HOSPADM

## 2023-03-06 RX ORDER — AMOXICILLIN 250 MG
2 CAPSULE ORAL 2 TIMES DAILY PRN
Status: DISCONTINUED | OUTPATIENT
Start: 2023-03-06 | End: 2023-03-08 | Stop reason: HOSPADM

## 2023-03-06 RX ORDER — NICOTINE POLACRILEX 4 MG
15-30 LOZENGE BUCCAL
Status: DISCONTINUED | OUTPATIENT
Start: 2023-03-06 | End: 2023-03-08 | Stop reason: HOSPADM

## 2023-03-06 RX ORDER — METOPROLOL SUCCINATE 50 MG/1
50 TABLET, EXTENDED RELEASE ORAL DAILY
Status: DISCONTINUED | OUTPATIENT
Start: 2023-03-06 | End: 2023-03-06

## 2023-03-06 RX ORDER — MAGNESIUM HYDROXIDE/ALUMINUM HYDROXICE/SIMETHICONE 120; 1200; 1200 MG/30ML; MG/30ML; MG/30ML
30 SUSPENSION ORAL EVERY 4 HOURS PRN
Status: DISCONTINUED | OUTPATIENT
Start: 2023-03-06 | End: 2023-03-08 | Stop reason: HOSPADM

## 2023-03-06 RX ADMIN — METOPROLOL SUCCINATE 100 MG: 50 TABLET, EXTENDED RELEASE ORAL at 18:16

## 2023-03-06 RX ADMIN — DOFETILIDE 250 MCG: 0.25 CAPSULE ORAL at 20:46

## 2023-03-06 RX ADMIN — INSULIN ASPART 5 UNITS: 100 INJECTION, SOLUTION INTRAVENOUS; SUBCUTANEOUS at 18:53

## 2023-03-06 RX ADMIN — MAGNESIUM OXIDE TAB 400 MG (241.3 MG ELEMENTAL MG) 400 MG: 400 (241.3 MG) TAB at 20:46

## 2023-03-06 RX ADMIN — LISINOPRIL 40 MG: 40 TABLET ORAL at 18:16

## 2023-03-06 RX ADMIN — RIVAROXABAN 20 MG: 20 TABLET, FILM COATED ORAL at 18:16

## 2023-03-06 ASSESSMENT — ACTIVITIES OF DAILY LIVING (ADL)
ADLS_ACUITY_SCORE: 35
ADLS_ACUITY_SCORE: 20
ADLS_ACUITY_SCORE: 20
ADLS_ACUITY_SCORE: 35
ADLS_ACUITY_SCORE: 20

## 2023-03-06 NOTE — TELEPHONE ENCOUNTER
Called and spoke with patient. From previous telephone encounter, patient is to still show up today March 6th at 1pm at the [facility]. Informed patient that it look as though patient is still set up for being admitted. Patient verbalized understanding and is planning on coming to the hospital.    Tamy Sheldon, RN, BSN  Cardiology RN Care Coordinator   [place]   Phone: [phone]  Fax: [phone] ([place]) [phone] ([place])

## 2023-03-06 NOTE — TELEPHONE ENCOUNTER
Patient admitted to hospital as planned.    Tamy Sheldon, RN, BSN  Cardiology RN Care Coordinator   [place]   Phone: [phone]  Fax: [phone] ([place]) [phone] ([place])

## 2023-03-06 NOTE — TELEPHONE ENCOUNTER
[facility] Call Center    Phone Message    May a detailed message be left on voicemail: yes     Reason for Call: Other: Pt is scheduled for a procedure today at 1:00 at [address] but she has not received any information if it is still going to happen or any prep that she needs.  Please call her ASAP to confirm      Action Taken: Message routed to:  [facility] ([facility] cardio    Travel Screening: Not Applicable

## 2023-03-06 NOTE — PHARMACY-CONSULT NOTE
Dofetilide (TIKOSYN) Drug Interaction Pharmacy Consult    67 year old female is being initiated on dofetilide for the treatment of atrial fibrillation.  Pharmacy has been consulted to evaluate the patient's current medication list to ensure there are no potential drug interactions with dofetilide.    Current Facility-Administered Medications   Medication     acetaminophen (TYLENOL) Suppository 650 mg     acetaminophen (TYLENOL) tablet 650 mg     alum & mag hydroxide-simethicone (MAALOX) suspension 30 mL     glucose gel 15-30 g    Or     dextrose 50 % injection 25-50 mL    Or     glucagon injection 1 mg     dofetilide (TIKOSYN) capsule 500 mcg     [START ON 3/7/2023] empagliflozin (JARDIANCE) tablet 10 mg     insulin aspart (NovoLOG) injection (RAPID ACTING)     insulin aspart (NovoLOG) injection (RAPID ACTING)     lidocaine (LMX4) cream     lidocaine 1 % 0.1-1 mL     lisinopril (ZESTRIL) tablet 40 mg     magnesium hydroxide (MILK OF MAGNESIA) suspension 30 mL     medication instruction     metFORMIN (GLUCOPHAGE) tablet 1,000 mg     metoprolol succinate ER (TOPROL XL) 24 hr tablet 50 mg     nitroGLYcerin (NITROSTAT) sublingual tablet 0.4 mg     ondansetron (ZOFRAN ODT) ODT tab 4 mg    Or     ondansetron (ZOFRAN) injection 4 mg     Patient is already receiving anticoagulation with heparin, enoxaparin (LOVENOX), warfarin (COUMADIN)  or other anticoagulant medication     rivaroxaban ANTICOAGULANT (XARELTO) tablet 20 mg     senna-docusate (SENOKOT-S/PERICOLACE) 8.6-50 MG per tablet 1 tablet    Or     senna-docusate (SENOKOT-S/PERICOLACE) 8.6-50 MG per tablet 2 tablet     sodium chloride (PF) 0.9% PF flush 3 mL     sodium chloride (PF) 0.9% PF flush 3 mL       Assessment/Recommendation:    The patient is on metformin, which competes with dofetilide for secretion via the renal cation transport system leading to increased dofetilide levels and potential. Also ordered this admission is ondansetron, which can further increase  QTc when given concomitantly with dofetilide increasing risk of TdP. Both are major drug interactions where alternate therapy should be considered, otherwise, close monitoring is required. The CARDS I team has been contacted regarding these drug-drug interactions.    Dre Romero RPH

## 2023-03-06 NOTE — H&P
[facility]   Cardiology Service  History and Physical      Ariana Palomino MRN# [id number]   YOB: 1955 Age: 67 year old       Admission Date: 3/6/2023  Admission Team: Francie Landrum      Assessment and plan:   Ariana Palomino is a 67 y.o.F with a PMhx of persistent atrial fibrillation, Hypertension, T2DM who is admitted for planned dofetilide initiation.     # Persistent atrial fibrillation  # Dofetilide initiation  Persistent atrial fibrillation since 10/2022 s/p DCCV 11/2022 with brief conversion to Sinus mayank (HR 40's). Pt continues to have palpitations and dyspnea. Admitted for planned dofetilide initiation    - Anticoagulation: CHADSVASC 4, Continue PTA Xarelto   - Rate control: Start Toprol 50 mg daily (half of her home dose), pending HR while on Dofetilide may need to reduce further or stop all together  - Rhythm control: Check EKG x 1, likely start Dofetilide 500 mcg BID  - Check EKG 2-3 hours after each dose Dofetilide, if Qtc greater than 500 or increases 15%, cut dose in half  - After 3-4 doses, if pt remains in atrial fibrillation, will plan on cardioversion, does not need MARY prior, pt reports no missed doses of Xarelto  - Daily BMP, Mg, replace lytes per protocol  - Admit to inpatient telemetry    Chronic Conditions:  - HTN: continue PTA Lisinopril  - T2DM: Check A1C x 1, POCT glucose checks, sliding scale insulin, continue Canagliflozin (substituted to Jardiance while IP due to med availability), Metformin        Clinically Significant Risk Factors Present on Admission                    Cardiovascular: Cardiac Arrhythmia: Atrial fibrillation: Persistent          FEN: Regular  Code status: Full  Prophylaxis:  PO Xarelto  Isolation: None  Disposition: Admit to inpatient telemetry, 2-3 days pending stable HR/rhythm    Patient discussed with Dr. Kyleigh Montero, APRN, CNP  [facility] Cardiology  Pager [phone]      Chief Complaint: atrial fibrillation, planned  Dofetilide initiation    HPI: Ariana Palomino is a 67 y.o.F with a PMhs of persistent atrial fibrillation, Hypertension, T2DM who is admitted for planned dofetilide initiation.     Patient has hx of atrial fibrillation dating back to 10/2022 with 3 weeks of persistent palpitations and dyspnea. She underwent cardioversion 11/2022 and was converted back to sinus bradycardia with HR in 40's. Shortly after, she went back into atrial fibrillation with recurrent palpitations and dyspnea.     Patient was seen in EP clinic 1/10 with , her metoprolol was increased to 100 mg daily. Given persistent symptoms, patient admitted for Dofetilide initiation. If she remains in atrial fibrillation, we will plan for cardioversion after first few doses.     At time of interview patient reports feeling palpitations, does continue to endorse NOLAN, states usually occurs when taking stairs. She denies any chest pain, lightheadedness, dizziness.       No past medical history on file.    Past Surgical History:   Procedure Laterality Date     ANESTHESIA CARDIOVERSION N/A 11/8/2022    Procedure: ANESTHESIA, FOR CARDIOVERSION@1130;  Surgeon: GENERIC ANESTHESIA PROVIDER;  Location:  OR       No current facility-administered medications on file prior to encounter.  canagliflozin (INVOKANA) 100 MG tablet, Take 100 mg by mouth  lisinopril (ZESTRIL) 40 MG tablet, Take 0.5 tablets (20 mg) by mouth 2 times daily (Patient taking differently: Take 40 mg by mouth daily)  metFORMIN (GLUCOPHAGE) 1000 MG tablet, TAKE 1 TABLET BY MOUTH TWICE A DAY WITH A MEAL  metoprolol succinate ER (TOPROL XL) 25 MG 24 hr tablet, Take 2 tablets (50 mg) by mouth daily  metoprolol succinate ER (TOPROL XL) 50 MG 24 hr tablet, Take 2 tablets (100 mg) by mouth daily  XARELTO ANTICOAGULANT 20 MG TABS tablet, TAKE 1 TABLET BY MOUTH DAILY WITH EVENING MEAL        No family history on file.    Social History     Tobacco Use     Smoking status: Never     Smokeless tobacco: Never    Substance Use Topics     Alcohol use: Not Currently       No Known Allergies      ROS:   CONSTITUTIONAL:No report of fevers or chills  RESPIRATORY: No cough, wheezing, SOB, or hemoptysis  CARDIOVASCULAR: see HPI  MUSCULO-SKELETAL: No joint pain/swelling, no muscle pain  NEURO: No paresthesias, syncope, pre-syncope, lightheadness, dizziness or vertigo  ENDOCRINE: No temperature intolerance, no skin/hair changes  PSYCHIATRIC: No change in mood, feeling down/anxious, no change in sleep or appetite  GI: no melena or hematochezia, no change in bowel habits  : no hematuria or dysuria, no hesitancy, dribbling or incontinence  HEME: no easy bruising or bleeding, no history of anemia, no history of blood clots  SKIN: no rashes or sores, no unusual itching      Physical Examination:  Vitals: There were no vitals taken for this visit.  BMI= There is no height or weight on file to calculate BMI.    GENERAL APPEARANCE: healthy, alert and no distress  HEENT: no icterus, no xanthelasmas, normal pupil size and reaction, normal palate, mucosa moist  NECK: no JVD, brisk carotid upstroke bilaterally  CHEST: lungs clear to auscultation without rales, rhonchi or wheezes, no use of accessory muscles, no retractions  CARDIOVASCULAR: irregularly irregular rhythm, rate controlled, normal S1 and S2, no S3 or S4 and no murmur, click or rub.  ABDOMEN: soft, non tender, without hepatosplenomegaly, no masses palpable, bowel sounds normal  EXTREMITIES: warm, no edema, DP/PT pulses 2+ bilaterally, no clubbing or cyanosis   NEURO: alert and oriented to person/place/time, normal speech, gait and affect  SKIN: no ecchymoses, no rashes      Laboratory:  CMPNo lab results found in last 7 days.  CBCNo lab results found in last 7 days.    No results found for: TROPI, TROPONIN, TROPR, TROPN      EKG 2/14/2023: Atrial Fibrillation,       TTE 10/25/2022:  Interpretation Summary  Left ventricular function is decreased. The ejection fraction is  50-55%  (borderline).  Arrythmia and cxxy-it-mwgt variation is present limiting accurate assessment  of the LV function, the LV function appears low normal.  Global right ventricular function is mildly reduced.  Severe left atrial enlargement is present.  IVC diameter and respiratory changes fall into an intermediate range  suggesting an RA pressure of 8 mmHg.     This study was compared with the study from 11/18/2020. The left ventricular  function has worsened.    Medical Decision Making   50 MINUTES SPENT BY ME on the date of service doing chart review, history, exam, documentation & further activities per the note.

## 2023-03-07 ENCOUNTER — TRANSFERRED RECORDS (OUTPATIENT)
Dept: HEALTH INFORMATION MANAGEMENT | Facility: CLINIC | Age: 68
End: 2023-03-07
Payer: COMMERCIAL

## 2023-03-07 LAB
ANION GAP SERPL CALCULATED.3IONS-SCNC: 9 MMOL/L (ref 7–15)
ATRIAL RATE - MUSE: 312 BPM
ATRIAL RATE - MUSE: 43 BPM
ATRIAL RATE - MUSE: NORMAL BPM
BUN SERPL-MCNC: 19.5 MG/DL (ref 8–23)
CALCIUM SERPL-MCNC: 8.9 MG/DL (ref 8.8–10.2)
CHLORIDE SERPL-SCNC: 102 MMOL/L (ref 98–107)
CREAT SERPL-MCNC: 1.11 MG/DL (ref 0.51–0.95)
DEPRECATED HCO3 PLAS-SCNC: 26 MMOL/L (ref 22–29)
DIASTOLIC BLOOD PRESSURE - MUSE: NORMAL MMHG
GFR SERPL CREATININE-BSD FRML MDRD: 54 ML/MIN/1.73M2
GLUCOSE BLDC GLUCOMTR-MCNC: 179 MG/DL (ref 70–99)
GLUCOSE BLDC GLUCOMTR-MCNC: 194 MG/DL (ref 70–99)
GLUCOSE BLDC GLUCOMTR-MCNC: 267 MG/DL (ref 70–99)
GLUCOSE BLDC GLUCOMTR-MCNC: 312 MG/DL (ref 70–99)
GLUCOSE BLDC GLUCOMTR-MCNC: 354 MG/DL (ref 70–99)
GLUCOSE SERPL-MCNC: 227 MG/DL (ref 70–99)
INTERPRETATION ECG - MUSE: NORMAL
MAGNESIUM SERPL-MCNC: 2 MG/DL (ref 1.7–2.3)
P AXIS - MUSE: 74 DEGREES
P AXIS - MUSE: NORMAL DEGREES
POTASSIUM SERPL-SCNC: 3.9 MMOL/L (ref 3.4–5.3)
PR INTERVAL - MUSE: 126 MS
PR INTERVAL - MUSE: NORMAL MS
QRS DURATION - MUSE: 100 MS
QRS DURATION - MUSE: 116 MS
QRS DURATION - MUSE: 88 MS
QRS DURATION - MUSE: 92 MS
QRS DURATION - MUSE: 94 MS
QT - MUSE: 394 MS
QT - MUSE: 404 MS
QT - MUSE: 448 MS
QT - MUSE: 590 MS
QT - MUSE: 592 MS
QTC - MUSE: 483 MS
QTC - MUSE: 497 MS
QTC - MUSE: 498 MS
QTC - MUSE: 523 MS
QTC - MUSE: 560 MS
R AXIS - MUSE: 107 DEGREES
R AXIS - MUSE: 51 DEGREES
R AXIS - MUSE: 58 DEGREES
R AXIS - MUSE: 59 DEGREES
R AXIS - MUSE: 60 DEGREES
SODIUM SERPL-SCNC: 137 MMOL/L (ref 136–145)
SYSTOLIC BLOOD PRESSURE - MUSE: NORMAL MMHG
T AXIS - MUSE: 153 DEGREES
T AXIS - MUSE: 182 DEGREES
T AXIS - MUSE: 187 DEGREES
T AXIS - MUSE: 197 DEGREES
T AXIS - MUSE: 203 DEGREES
VENTRICULAR RATE- MUSE: 43 BPM
VENTRICULAR RATE- MUSE: 47 BPM
VENTRICULAR RATE- MUSE: 86 BPM
VENTRICULAR RATE- MUSE: 94 BPM
VENTRICULAR RATE- MUSE: 96 BPM

## 2023-03-07 PROCEDURE — 99223 1ST HOSP IP/OBS HIGH 75: CPT | Performed by: CLINICAL NURSE SPECIALIST

## 2023-03-07 PROCEDURE — 250N000012 HC RX MED GY IP 250 OP 636 PS 637: Performed by: STUDENT IN AN ORGANIZED HEALTH CARE EDUCATION/TRAINING PROGRAM

## 2023-03-07 PROCEDURE — 93010 ELECTROCARDIOGRAM REPORT: CPT | Performed by: INTERNAL MEDICINE

## 2023-03-07 PROCEDURE — 250N000013 HC RX MED GY IP 250 OP 250 PS 637: Performed by: INTERNAL MEDICINE

## 2023-03-07 PROCEDURE — 250N000013 HC RX MED GY IP 250 OP 250 PS 637: Performed by: NURSE PRACTITIONER

## 2023-03-07 PROCEDURE — 80048 BASIC METABOLIC PNL TOTAL CA: CPT | Performed by: NURSE PRACTITIONER

## 2023-03-07 PROCEDURE — 93005 ELECTROCARDIOGRAM TRACING: CPT

## 2023-03-07 PROCEDURE — 93010 ELECTROCARDIOGRAM REPORT: CPT | Mod: 76 | Performed by: INTERNAL MEDICINE

## 2023-03-07 PROCEDURE — 214N000001 HC R&B CCU UMMC

## 2023-03-07 PROCEDURE — 83735 ASSAY OF MAGNESIUM: CPT | Performed by: NURSE PRACTITIONER

## 2023-03-07 PROCEDURE — 99232 SBSQ HOSP IP/OBS MODERATE 35: CPT | Mod: FS | Performed by: NURSE PRACTITIONER

## 2023-03-07 PROCEDURE — 36415 COLL VENOUS BLD VENIPUNCTURE: CPT | Performed by: NURSE PRACTITIONER

## 2023-03-07 RX ORDER — POTASSIUM CHLORIDE 750 MG/1
20 TABLET, EXTENDED RELEASE ORAL ONCE
Status: COMPLETED | OUTPATIENT
Start: 2023-03-07 | End: 2023-03-07

## 2023-03-07 RX ORDER — METHOCARBAMOL 500 MG/1
500 TABLET, FILM COATED ORAL EVERY 6 HOURS PRN
Status: DISCONTINUED | OUTPATIENT
Start: 2023-03-07 | End: 2023-03-08 | Stop reason: HOSPADM

## 2023-03-07 RX ORDER — DOFETILIDE 0.12 MG/1
125 CAPSULE ORAL EVERY 12 HOURS SCHEDULED
Status: DISCONTINUED | OUTPATIENT
Start: 2023-03-07 | End: 2023-03-08 | Stop reason: HOSPADM

## 2023-03-07 RX ADMIN — INSULIN ASPART 1 UNITS: 100 INJECTION, SOLUTION INTRAVENOUS; SUBCUTANEOUS at 13:16

## 2023-03-07 RX ADMIN — LISINOPRIL 40 MG: 40 TABLET ORAL at 17:01

## 2023-03-07 RX ADMIN — SENNOSIDES AND DOCUSATE SODIUM 1 TABLET: 50; 8.6 TABLET ORAL at 17:01

## 2023-03-07 RX ADMIN — POTASSIUM CHLORIDE 20 MEQ: 750 TABLET, EXTENDED RELEASE ORAL at 08:57

## 2023-03-07 RX ADMIN — RIVAROXABAN 20 MG: 20 TABLET, FILM COATED ORAL at 17:01

## 2023-03-07 RX ADMIN — INSULIN GLARGINE 15 UNITS: 100 INJECTION, SOLUTION SUBCUTANEOUS at 02:02

## 2023-03-07 RX ADMIN — MAGNESIUM OXIDE TAB 400 MG (241.3 MG ELEMENTAL MG) 400 MG: 400 (241.3 MG) TAB at 01:32

## 2023-03-07 RX ADMIN — INSULIN GLARGINE 15 UNITS: 100 INJECTION, SOLUTION SUBCUTANEOUS at 21:22

## 2023-03-07 RX ADMIN — EMPAGLIFLOZIN 10 MG: 10 TABLET, FILM COATED ORAL at 08:57

## 2023-03-07 RX ADMIN — INSULIN ASPART 2 UNITS: 100 INJECTION, SOLUTION INTRAVENOUS; SUBCUTANEOUS at 09:03

## 2023-03-07 RX ADMIN — INSULIN ASPART 4 UNITS: 100 INJECTION, SOLUTION INTRAVENOUS; SUBCUTANEOUS at 18:14

## 2023-03-07 ASSESSMENT — ACTIVITIES OF DAILY LIVING (ADL)
ADLS_ACUITY_SCORE: 20
ADLS_ACUITY_SCORE: 21
ADLS_ACUITY_SCORE: 20
ADLS_ACUITY_SCORE: 20

## 2023-03-07 NOTE — PLAN OF CARE
D: Admitted s/p Tikosyn initiation  PMH of persistent Atrial fibrillation, HTN, T2DM     I: Monitored vitals and assessed pt status.      A: A0x4. Afebrile. VSS. HRs 30-50s. Sinus bradycardia and junctional rhythm. Sats >92% on RA. Pt denies any shortness of breath, chest pain/palpitations, nausea, dizziness, or chills. Tikosyn held this morning. Per provider, dose to be given as long as pt's rhythm is sustained sinus bradycardia/rhythm. BGs before meals. Pt on regular diet. Up independently.     P: Continue to monitor pt status and notify Cards 1 treatment team with any changes or concerns.       Goal Outcome Evaluation:     Plan of Care Reviewed With: patient    Overall Patient Progress: no change

## 2023-03-07 NOTE — UTILIZATION REVIEW
Admission Status; Secondary Review Determination       Under the authority of the Utilization Management Committee, the utilization review process indicated a secondary review on the above patient. The review outcome is based on review of the medical records, discussions with staff, and applying clinical experience noted on the date of the review.     (x) Inpatient Status Appropriate - This patient's medical care is consistent with medical management for inpatient care and reasonable inpatient medical practice.     RATIONALE FOR DETERMINATION   67-year-old female with persistent atrial fibrillation, hypertension, and T2DM is admitted for planned initiation of dofetilide. She had a previous DCCV with brief conversion to sinus mayank, but continues to have palpitations and dyspnea. Anticoagulation will continue with Xarelto, while Toprol 50mg will be started for rate control. Dofetilide 500 mcg BID will be started for rhythm control, and EKGs will be checked 2-3 hours after each dose to monitor Qtc levels. If Qtc is greater than 500 or increases by 15%, the dose will be halved. After 3-4 doses, cardioversion will be planned if the patient remains in atrial fibrillation. Daily BMP and lytes will be checked and replaced per protocol, and the patient will be admitted to inpatient telemetry.    Patient requires inpatient admission versus short stay observation or outpatient treatment for the following reasons:  Tikosyn requires a 3-day hospital admission This is because the medication can lead to QT prolongation and torsades the point and other malignant arrhythmias  TIKOSYN (DOFETILIDE) LOADING - [facility] [place]. [email or web address]-content/[email or web address] Accessed 3/7/2023.  TIKOSYN  Dosage and Administration (dofetilide)  [email or web address] .... [email or web address] Accessed 3/7/2023.  Tikosyn load/ablation - Heart  Rhythm - [employer]Help. https://www.medhelp.org/posts/Heart-Rhythm/Tikosyn-load-ablation/show/9970889 Accessed 3/7/2023.    The expected length of stay at the time of admission was more than 2 nights because of the severity of illness, intensity of service provided, and risk for adverse outcome. Inpatient admission is appropriate.         This document was produced using voice recognition software       The information on this document is developed by the utilization review team in order for the business office to ensure compliance. This only denotes the appropriateness of proper admission status and does not reflect the quality of care rendered.   The definitions of Inpatient Status and Observation Status used in making the determination above are those provided in the CMS Coverage Manual, Chapter 1 and Chapter 6, section 70.4.   Sincerely,   TONY LEGER MD   System Medical Director   Utilization Management   [facility].

## 2023-03-07 NOTE — PROGRESS NOTES
Cardiology Progress Note      Assessment & Plan:  Ariana Palomino is a 67 year old female with a history of persistent atrial fibrillation, Hypertension, T2DM who is admitted for planned dofetilide initiation.    Today's Update:  - Endocrine consult  - Pharm consult for SGLT2 coverage  - Hold AM Dofetilide 125 mcg in setting of junctional rhythm, will resume once in more sustained sinus rhythm with EKG post administration     # Persistent atrial fibrillation  # Dofetilide initiation  # Bradycardia  # Junctional Rhythm  Persistent atrial fibrillation since 10/2022 s/p DCCV 11/2022 with brief conversion to Sinus mayank (HR 40's). Pt continues to have palpitations and dyspnea. Admitted for planned dofetilide initiation     - Anticoagulation: CHADSVASC 4, Continue PTA Xarelto   - Rate control: Hold Toprol 50 mg daily d/t bradycardia, junctional rhythm  - Rhythm control: Check EKG x 1, start Dofetilide 250 mcg BID  - Check EKG 2-3 hours after each dose Dofetilide, if Qtc greater than 500 or increases 15%, cut dose in half  - Patient converted to junctional rhythm rates 30-50 after 1st dose of dofetilide, QTC > 500 so dose decreased to 125 mcg BID  - Holding dofetilide until patients heart rate is sinus rhythm vs junctional, dofetilide has minimal effect on worsening bradycardia so ok to give if HR less than 60 but sinus.  - Daily BMP, Mg, replace lytes per protocol  - Admit to inpatient telemetry     Type 2 DM:  - A1C on admission 13%,   - Patient taking Metformin PTA, spoke with pharmacy who is concerned with Metformin and Dofetilide interaction  - Started Lantus 15 units ovrenight  - Patient unable to afford Invokana, Pharm consult, consult endocrinology    Incidental COVID-19 infection  - COVID-19 positive on admission, asymptomatic  - Continue to monitor for worsening respiratory status    Chronic Conditions:  - HTN: continue PTA Lisinopril    FEN: Regular  Code Status: FULL  Prophylaxis: Xarelto  Isolation: COVID-19  "  Disposition: pending dofetilide tolerance    Patient discussed w/ Dr. Arizmendi, who agrees with above plan.    MARK Zuniga, CNP  [facility]  Cards 1  [place] [id number]    Medical Decision Making       45 MINUTES SPENT BY ME on the date of service doing chart review, history, exam, documentation & further activities per the note.      Interval History:  NAEO, telemetry, labs, vital signs and nursing notes reviewed.  Patient states she feels much better now that she is no longer in atrial fibrillation. Discussed need for patient to increase activity to hopefully increase HR/resume sinus rhythm. This is difficult since she is incidentally COVID positive which limits her activity in the halls.   Also discussed A1C results with patient. She states she was not taking her Invokana due to concern this would lead to amputations: provided education on the risk of uncontrolled diabetes including amputation, heart attack or stroke. Patient was open to endocrinology consult.       Most recent vital signs:  /66 (BP Location: Right arm)   Pulse (!) 47   Temp 97.9  F (36.6  C) (Oral)   Resp 18   Ht 1.626 m (5' 4\")   Wt 89.9 kg (198 lb 4.8 oz)   SpO2 100%   BMI 34.04 kg/m    Temp:  [97.9  F (36.6  C)-98.5  F (36.9  C)] 97.9  F (36.6  C)  Pulse:  [] 47  Resp:  [16-18] 18  BP: (128-160)/() 128/66  SpO2:  [96 %-100 %] 100 %  Wt Readings from Last 2 Encounters:   03/07/23 89.9 kg (198 lb 4.8 oz)   02/14/23 92.9 kg (204 lb 12.8 oz)       Intake/Output Summary (Last 24 hours) at 3/7/2023 0852  Last data filed at 3/7/2023 0200  Gross per 24 hour   Intake 1120 ml   Output 1300 ml   Net -180 ml       Physical exam:  General: Pleasant elderly female. Appears comfortable and in no acute distress. Alert and interactive  HEENT: Normocephalic, atraumatic. No scleral icterus or injection  Neck: JVP not elevated  CARDIAC: Regular rate and rhythm, no m/r/g appreciated. Peripheral pulses " 2+  RESP: Normal work of breathing on room air without use of accessory breathing muscles. Clear to auscultation in all fields. No wheezes, rhonchi or crackles appreciated.  GI: No abdominal distention. Soft and nontender.   EXTREMITIES: Without lower extremity edema. No cyanosis or clubbing. Warm and well perfused. No venous stasis changes.   SKIN: No acute lesions appreciated. Warm and dry to touch  NEURO: Alert and oriented X3, CN II-XII grossly intact, no focal neurological deficits noted, normal speech  PSYCH: Mood and affect are appropriate    Labs (Past three days):  CBC  Recent Labs   Lab 03/06/23  1523   WBC 3.5*   RBC 5.76*   HGB 14.3   HCT 46.8   MCV 81   MCH 24.8*   MCHC 30.6*   RDW 15.2*   *     BMP  Recent Labs   Lab 03/07/23  0656 03/07/23  0435 03/06/23  2221 03/06/23  1828 03/06/23  1523     --   --   --  136   POTASSIUM 3.9  --   --   --  4.0   CHLORIDE 102  --   --   --  101   CO2 26  --   --   --  24   ANIONGAP 9  --   --   --  11   * 267* 357* 374* 481*   BUN 19.5  --   --   --  21.2   CR 1.11*  --   --   --  1.08*   GFRESTIMATED 54*  --   --   --  55*   MCKAYLA 8.9  --   --   --  9.2   MAG 2.0  --   --   --  1.9     EKG 2/14/2023: Atrial Fibrillation,        EKG 3/7/23 Junctional Rhythm rate 47:    TTE 10/25/2022:  Interpretation Summary  Left ventricular function is decreased. The ejection fraction is 50-55%  (borderline).  Arrythmia and [id number]-[id number] variation is present limiting accurate assessment  of the LV function, the LV function appears low normal.  Global right ventricular function is mildly reduced.  Severe left atrial enlargement is present.  IVC diameter and respiratory changes fall into an intermediate range  suggesting an RA pressure of 8 mmHg.     This study was compared with the study from 11/18/2020. The left ventricular  function has worsened.

## 2023-03-07 NOTE — CONSULTS
Discharge Pharmacy Test Claim    Patient has $35 copays for farxiga and jardiance through her [facility] prepaid medical assistance plan.    Test Claim Copay   farxiga 35.00   jardiance 35.00       Lanie Hilliard  [facility] Pharmacy Liaison  Ph: [phone] Pager: [phone]   Securely message with the Vocera Web Console (learn more here)

## 2023-03-07 NOTE — CARE PLAN
Admission    Diagnosis: A-fib scheduled admission for dofetilide initiation  Admitted from: Home  Via: Ambulatory  Accompanied by: Daughter dropped patient off  Belongings: Clothes, jacket, boots, backpack, purse, cell phone, and  at bedside. Patient to send home purse and med with daughter.   Admission Profile: Complete  Teaching: Orientation to unit, call don't fall, use of console, meal times, visiting hours, when to call for the RN (angina/sob/dizziness, etc.), and enforced importance of safety. Discussed medication changes and plan of care. Patient verbalized understanding.   Access: PIV placed  Telemetry: Placed on patient- A-fib  Height/Weight: Complete  Skin: intact- 2 RN check completed with Sarah Rogers RN    Patient's asymptomatic COVID test was positive. MD notified. Pt placed on special precautions.

## 2023-03-07 NOTE — CARE PLAN
D: PMhx of persistent atrial fibrillation, Hypertension, T2DM who is admitted for planned dofetilide initiation.     I: First dose of dofetilide given. EKG taken 2 hours post dose. Notified cross-cover of elevated BP and blood sugar, no new orders at this time.       Changed: First dose of dofetilide given  Running: PIV saline locked  PRN: none  Tele: a-fib  O2: room air  Mobility: up ad nicko     A: A&Ox4. Hypertensive. Blood glucose elevated. Voiding. Tolerating PO intake.      P: EKGs 2-3 hours post dofetilide doses. Continue to monitor pt status and report changes to treatment team.

## 2023-03-07 NOTE — DISCHARGE INSTRUCTIONS
Diabetes plan recommendation:     Check glucose two times per day, before breakfast and in the evening (make note if the check is before or after eating)  Take metformin 1 gm twice per day with meals (starting with evening meal on 3/8/23)  Take Jardiance 25 mg daily (mainly for heart failure, but also improves your glucose control) (starting on morning of 3/9/23 since you already received a dose today)     Follow up with Dr. Cook within a week to review your blood glucose.  Additional options can be considered:  -Victoza daily injection (35.00 copay)  -Glucose sensor Freestyle Monet 2 (35.00 copay)     Your hemoglobin A1C was dangerously high.  An appropriate goal for you is in the 7% range.        Lab Results   Component Value Date     A1C 13.0 03/06/2023      With improved glucose control you will have more energy and decrease your risk for complications.  If these are your goals, you can partner with your care team to find the best medications, tools and habits.            Regarding lower limb amputation risk with Invokana:  Association between sodium-glucose cotransporter 2 (SGLT2) inhibitors and lower extremity amputation: A systematic review and meta-analysis  Chemo Anglin 1, Jose Morocho 2, Alexandra Katz 1, Helena Pena 3, DEVAUGHN Moulton 1 4 5  The random effects meta-analysis of 7 RCTs suggested the absence of a statistically significant association between SGLT2i exposure.  Subgroup analysis of canagliflozin vs placebo showed a statistically significantly increased risk in a fixed effects meta-analysis (n = 2 RCTs, RR 1.59, 1.26-2.01; I2 = 88%; p = 0.0001) whereas the meta-analysis of dapagliflozin or empagliflozin (n = 2 RCTs each) and a single RCT for ertugliflozin did not show a significantly increased risk.  Overall, there was no consistent evidence of SGLT2i exposure and increased risk of amputation. The increased risk of amputation seen in the large, long-term Canagliflozin Cardiovascular  Assessment Study (CANVAS) trial for canagliflozin, and select observational studies, merits continued exploration.    And 2021---Bulmaro Macias MD, MPH, a co-author of the new study, says these findings should allay some of the concerns over the use of SGLT2 inhibitors in regard to amputation risk.     After the CANVAS trial, there was a great deal of concern about amputation risk with canagliflozin specifically and SGLT2 inhibitors in general. In our large observational analysis (not randomized), we found no relationship between SGLT2 inhibitors and amputation,  notes Dr. Macias, a Professor of Medicine at [facility].    I think our data are very reassuring and suggest that in patients in whom doctors choose to use SGLT2 inhibitors, there does not appear to be a detectable risk in amputation.

## 2023-03-07 NOTE — PROGRESS NOTES
"SPIRITUAL HEALTH SERVICES  SPIRITUAL ASSESSMENT Progress Note  [facility] 6C     REFERRAL SOURCE: Routine Consult     Pt welcomed  visit.  She shared that she is doing \"better than yesterday,\" although is disappointed she tested positive for Covid.  She lives with her family (daughter and REGINA) and has two other daughters here, and other family in [place].  Pt was raised [employer] but has become involved with the [employer].  She derives meaning from her family and her melinda, and was receptive to prayer from  for continued peace and healing.    PLAN: SHS will remain available and continue to visit as requested.     Violeta Jovel    Pager: [phone]    "

## 2023-03-07 NOTE — PLAN OF CARE
Admitted for planned dofetilide initiation. 1st dose given (3/6). PMHx of persistent atrial fibrillation, Hypertension, T2DM     Code status: Full Code     Team: Cards 1     Neuro: AOx4, Calm and cooperative, no deficits  Cardiac/Tele: Afib to Junctional with Braycardia (Lows of 38 bpm). Pt was symptomatic once with SOB and Diaphoresis, Resident saw pt at bedside, EKG was done. No interventions were done (Continue to monitor). Pt denies CP or palpitations and any other symptoms. BP elevated  (SBP's 130's-160's). Get 12 Lead EKG 2-3 hrs after dofetilide administration  Respiratory: Room air, O2 sats > 92%. No SOB noted. LS clear dim on the bases  GI/: Voids spontaneously. BS audible and normoactive, LBM (3/5)  Diet/Appetite: Regular diet  Skin: No overt deficits  Endocrine: BG checks ACHS  LDAs: PIV saline locked  Activity: Up ad nicko  Pain: Denies pain     Plan: Continue to monitor. Team to decide POC. Notify team of concerns

## 2023-03-07 NOTE — PROVIDER NOTIFICATION
"Called Cards 1 fellow to notify her that pt c/o chest \"fluttering,\" but denied palpitations or chest pain. EKG obtained, SB w/ PACS, relatively unchanged from prior. /84. MD to f/u.     Also FYI'd that pt was c/o vaginal itchiness and wanted to be checked for yeast infection. MD stated it is not appropriate at this time and that patient should been seen outpatient in clinic.   "

## 2023-03-07 NOTE — CONSULTS
"Diabetes/Hyperglycemia Management Consult    Reason for consult : uncontrolled diabetes on metformin alone and unable to continue due to interaction with dofetilide  Consult requested by: Mary Jo FLORES  History of Present Illness Ariana Palomino is a 67 y.o.Female with a PMhx of persistent atrial fibrillation, Hypertension, and type 2 diabetes who is admitted for planned dofetilide initiation. Note was unable to get morning dose today, due to bradycardia. Found to have Covid-- asymptomatic.    Metformin-dofetilide interaction of concern.        Feeling so tired.  Had not been sleeping at home.  Up to bathroom indep without dizziness.  Ate a late breakfast so was planning to skip lunch today.    Never been on insulin at home.  Did try Invokana for a time and cannot recall any side effects-- only took it 2-3 days.  Not taking PTA, due to ongoing concern about amputation risk.  Offered review of risk factors, likelihood, any within-class choice that would alleviate her concern, but she says she doesn't want to consider it.  Only taking metfomrin PTA.  [facility] \"Da[facility]\" (glibenclamide) when she declined the insulin injections her provider recommended.    Did not want to do injections, fear of needle pain and frequency of finger pokes.  Never had Victoza or non-insulin injection either.    Injections received in hospital so far are very tolerable to her.  She is receptive to exploration of GLP-1 RA and glucose sensor.      Recent Labs   Lab 03/07/23  0902 03/07/23  0656 03/07/23  0435 03/06/23  2221 03/06/23  1828 03/06/23  1523   * 227* 267* 357* 374* 481*         Diabetes Type: 2   Diabetes Duration: since 2000 (does not recall diabetes in pregnancy)  Usual Diabetes Regimen:   BG monitoring frequency-- twice per week  Wheat bulgar is main source of carbs, occasionally rice.  Vegetables and fish, occasionally meat.  Activity use to be walking to park with [employer] and to store.  More " "recently, unable to tolerate  Metformin 1 gm BID    When BG is high, fasting was the only way she'd get it down.  No fasting lately, due to arrhythmia.     Ability to [place] Prescribed Regimen: barriers have mostly been attitudes/fear  Diabetes Control:   Lab Results   Component Value Date    A1C 13.0 03/06/2023     Diabetes Complications: right great toe peripheral neuropathy  History of DKA: no  Able to Detect Hypoglycemia: has never experienced, knows some symptoms  Usual Diabetes Care Provider: Dr Oma Cook, also saw pharmD at [facility]  Factors Impacting Glucose Control: withholding metformin, change to hospital food.      Review of Systems  10 point ROS completed with pertinent positives and negatives noted in the HPI    Past medical, family and social histories are reviewed and updated.    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  Now living near daughter in [place]    Physical Exam  /76 (BP Location: Right arm)   Pulse (!) 43   Temp 98.1  F (36.7  C) (Oral)   Resp 16   Ht 1.626 m (5' 4\")   Wt 89.9 kg (198 lb 4.8 oz)   SpO2 100%   BMI 34.04 kg/m      General:  pleasant  resting in bed, in no distress.   HEENT: NC/AT, PER and anicteric, non-injected, oral mucous membranes moist.   Lungs: unlabored respiration, no cough  ABD: rounded  Skin:dry, no obvious lesions, feet in good condition with some toenail thickening  MSK:  fluid movement of all extremities  Lymp:  no LE edema   Mental status:  Arouse to voice, then able to maintain awake, oriented x3, communicating clearly  Psych:  calm, even mood    Laboratory  Recent Labs   Lab Test 03/07/23  0902 03/07/23  0656 03/06/23  1828 " 03/06/23  1523   NA  --  137  --  136   POTASSIUM  --  3.9  --  4.0   CHLORIDE  --  102  --  101   CO2  --  26  --  24   ANIONGAP  --  9  --  11   * 227*   < > 481*   BUN  --  19.5  --  21.2   CR  --  1.11*  --  1.08*   MCKAYLA  --  8.9  --  9.2    < > = values in this interval not displayed.     CBC RESULTS:   Recent Labs   Lab Test 03/06/23  1523   WBC 3.5*   RBC 5.76*   HGB 14.3   HCT 46.8   MCV 81   MCH 24.8*   MCHC 30.6*   RDW 15.2*   *       Liver Function Studies -   Recent Labs   Lab Test 10/25/22  1311   PROTTOTAL 7.4   ALBUMIN 4.4   BILITOTAL 0.6   ALKPHOS 54   AST 18   ALT 19       Active Medications  Current Facility-Administered Medications   Medication     acetaminophen (TYLENOL) Suppository 650 mg     acetaminophen (TYLENOL) tablet 650 mg     alum & mag hydroxide-simethicone (MAALOX) suspension 30 mL     glucose gel 15-30 g    Or     dextrose 50 % injection 25-50 mL    Or     glucagon injection 1 mg     dofetilide (TIKOSYN) capsule 125 mcg     [Held by provider] empagliflozin (JARDIANCE) tablet 10 mg     insulin aspart (NovoLOG) injection (RAPID ACTING)     insulin aspart (NovoLOG) injection (RAPID ACTING)     insulin glargine (LANTUS PEN) injection 15 Units     lidocaine (LMX4) cream     lidocaine 1 % 0.1-1 mL     lisinopril (ZESTRIL) tablet 40 mg     magnesium hydroxide (MILK OF MAGNESIA) suspension 30 mL     medication instruction     methocarbamol (ROBAXIN) tablet 500 mg     [Held by provider] metoprolol succinate ER (TOPROL XL) 24 hr tablet 50 mg     nitroGLYcerin (NITROSTAT) sublingual tablet 0.4 mg     rivaroxaban ANTICOAGULANT (XARELTO) tablet 20 mg     senna-docusate (SENOKOT-S/PERICOLACE) 8.6-50 MG per tablet 1 tablet    Or     senna-docusate (SENOKOT-S/PERICOLACE) 8.6-50 MG per tablet 2 tablet     sodium chloride (PF) 0.9% PF flush 3 mL     sodium chloride (PF) 0.9% PF flush 3 mL     No current outpatient medications on file.       Current Diet  Orders Placed This Encounter       Regular Diet Adult        Assessment  Uncontrolled type 2 diabetes, complicated by peripheral neuropathy      Plan    - Victoza test claim-- 35.00 copay--> will plan to start tomorrow after discussion w/ pt.    - empagliflozin is on hold (sub for canagliflozin, but pt was NOT taking the reji)  - glargine 15 units at night  - aspart medium correction  - does not appear to need prandial aspart at this time  - BG qAC, HS 0200  - education needs identified: yes, will provide GLP-1 RA handout and request injection teaching.  Freestyle Monet 2 (also 35.00 copay) teaching may be inpatient or in clinic (pt has meter at home for interim )  - prescriptions needed on discharge: TBD  - outpatient follow up: Dr. Cook and MTM pharmD, and recommend diabetes education as well.      Laura Sweeney APRN CNS         90 minutes spent on the date of the encounter doing chart review, history and exam, coordinating care/communication, documentation and further activities per the note.        To contact Endocrine Diabetes service:   From 8AM-4PM: page inpatient diabetes provider that is following the patient that day (see filed or incomplete progress notes/consult notes).  If uncertain of provider assignment: page job code [phone] or review in [facility].  For questions or updates from 4PM-8AM: page the diabetes job code for on call fellow: [phone]    Please notify inpatient diabetes service if changes are planned to steroids, enteral feeding, parenteral feeding, or if procedures are planned requiring prolonged NPO status.

## 2023-03-07 NOTE — PHARMACY-ADMISSION MEDICATION HISTORY
Admission Medication History Completed by Pharmacy    See [facility] Admission Navigator for allergy information, preferred outpatient pharmacy, prior to admission medications and immunization status.     Medication History Sources:     Patient interview and fill history review through [employer]    Changes made to PTA medication list (reason):    Added: None    Deleted: None    Changed:   o Metoprolol XL 50 mg daily changed to 100 mg daily    Additional Information:    Metoprolol dose increased to 100 mg daily in the past month    Patient reports not taking Invokana due to expense. She is also concerned about the rising cost of her Xarelto and wonders about alternatives. However, she reports that she has taken her Xarelto consistently over the past month, but now only has two pills remaining before she needs another refill.     Prior to Admission medications    Medication Sig Last Dose Taking? Auth Provider Long Term End Date   canagliflozin (INVOKANA) 100 MG tablet Take 100 mg by mouth every morning (before breakfast) More than a month at Not taking Yes Reported, Patient     lisinopril (ZESTRIL) 40 MG tablet Take 0.5 tablets (20 mg) by mouth 2 times daily  Patient taking differently: Take 40 mg by mouth daily 3/5/2023 at 1700 Yes Kristina Arizmendi MD Yes    metFORMIN (GLUCOPHAGE) 1000 MG tablet TAKE 1 TABLET BY MOUTH TWICE A DAY WITH A MEAL 3/6/2023 at AM Yes Reported, Patient Yes    metoprolol succinate ER (TOPROL XL) 25 MG 24 hr tablet Take 2 tablets (50 mg) by mouth daily  Patient taking differently: Take 100 mg by mouth daily 3/5/2023 at 1700 Yes Kristina Arizmendi MD Yes    XARELTO ANTICOAGULANT 20 MG TABS tablet TAKE 1 TABLET BY MOUTH DAILY WITH EVENING MEAL 3/5/2023 at 1700 Yes Reported, Patient Yes        Date completed: 03/06/23    Medication history completed by: Dre Romero [facility]

## 2023-03-07 NOTE — CONSULTS
Discharge Pharmacy Test Claim    Victozia is covered with a copay of $35 through patient's [facility] prepaid medical assistance plan.    Test Claim Copay   victoza 35.00       Lanie Hilliard  [facility] Pharmacy Liaison  Ph: [phone] Pager: [phone]   Securely message with the Vocera Web Console (learn more here)

## 2023-03-08 VITALS
HEART RATE: 42 BPM | WEIGHT: 198.3 LBS | RESPIRATION RATE: 16 BRPM | TEMPERATURE: 97.8 F | HEIGHT: 64 IN | SYSTOLIC BLOOD PRESSURE: 155 MMHG | BODY MASS INDEX: 33.86 KG/M2 | DIASTOLIC BLOOD PRESSURE: 83 MMHG | OXYGEN SATURATION: 100 %

## 2023-03-08 LAB
ANION GAP SERPL CALCULATED.3IONS-SCNC: 8 MMOL/L (ref 7–15)
ATRIAL RATE - MUSE: 44 BPM
BUN SERPL-MCNC: 28.5 MG/DL (ref 8–23)
CALCIUM SERPL-MCNC: 9.3 MG/DL (ref 8.8–10.2)
CHLORIDE SERPL-SCNC: 106 MMOL/L (ref 98–107)
CREAT SERPL-MCNC: 1.23 MG/DL (ref 0.51–0.95)
DEPRECATED HCO3 PLAS-SCNC: 27 MMOL/L (ref 22–29)
DIASTOLIC BLOOD PRESSURE - MUSE: NORMAL MMHG
GFR SERPL CREATININE-BSD FRML MDRD: 47 ML/MIN/1.73M2
GLUCOSE BLDC GLUCOMTR-MCNC: 105 MG/DL (ref 70–99)
GLUCOSE SERPL-MCNC: 118 MG/DL (ref 70–99)
HOLD SPECIMEN: NORMAL
INTERPRETATION ECG - MUSE: NORMAL
MAGNESIUM SERPL-MCNC: 2.2 MG/DL (ref 1.7–2.3)
P AXIS - MUSE: 117 DEGREES
POTASSIUM SERPL-SCNC: 4.4 MMOL/L (ref 3.4–5.3)
PR INTERVAL - MUSE: 94 MS
QRS DURATION - MUSE: 88 MS
QT - MUSE: 584 MS
QTC - MUSE: 499 MS
R AXIS - MUSE: 66 DEGREES
SODIUM SERPL-SCNC: 141 MMOL/L (ref 136–145)
SYSTOLIC BLOOD PRESSURE - MUSE: NORMAL MMHG
T AXIS - MUSE: 158 DEGREES
VENTRICULAR RATE- MUSE: 44 BPM

## 2023-03-08 PROCEDURE — 250N000013 HC RX MED GY IP 250 OP 250 PS 637: Performed by: NURSE PRACTITIONER

## 2023-03-08 PROCEDURE — 83735 ASSAY OF MAGNESIUM: CPT | Performed by: NURSE PRACTITIONER

## 2023-03-08 PROCEDURE — 80048 BASIC METABOLIC PNL TOTAL CA: CPT | Performed by: NURSE PRACTITIONER

## 2023-03-08 PROCEDURE — 99239 HOSP IP/OBS DSCHRG MGMT >30: CPT | Mod: FS | Performed by: NURSE PRACTITIONER

## 2023-03-08 PROCEDURE — 99233 SBSQ HOSP IP/OBS HIGH 50: CPT | Performed by: CLINICAL NURSE SPECIALIST

## 2023-03-08 PROCEDURE — 36415 COLL VENOUS BLD VENIPUNCTURE: CPT | Performed by: NURSE PRACTITIONER

## 2023-03-08 RX ORDER — SPIRONOLACTONE 25 MG/1
25 TABLET ORAL DAILY
Status: DISCONTINUED | OUTPATIENT
Start: 2023-03-08 | End: 2023-03-08 | Stop reason: HOSPADM

## 2023-03-08 RX ORDER — LISINOPRIL 40 MG/1
40 TABLET ORAL DAILY
Qty: 90 TABLET | Refills: 3 | Status: SHIPPED | OUTPATIENT
Start: 2023-03-08

## 2023-03-08 RX ORDER — RIVAROXABAN 20 MG/1
20 TABLET, FILM COATED ORAL
Qty: 90 TABLET | Refills: 3 | Status: SHIPPED | OUTPATIENT
Start: 2023-03-08

## 2023-03-08 RX ADMIN — EMPAGLIFLOZIN 10 MG: 10 TABLET, FILM COATED ORAL at 07:56

## 2023-03-08 RX ADMIN — SPIRONOLACTONE 25 MG: 25 TABLET, FILM COATED ORAL at 07:56

## 2023-03-08 ASSESSMENT — ACTIVITIES OF DAILY LIVING (ADL)
ADLS_ACUITY_SCORE: 20
ADLS_ACUITY_SCORE: 21
ADLS_ACUITY_SCORE: 20
ADLS_ACUITY_SCORE: 20
ADLS_ACUITY_SCORE: 21
ADLS_ACUITY_SCORE: 20
ADLS_ACUITY_SCORE: 20

## 2023-03-08 NOTE — PLAN OF CARE
I/A: A/Ox4. VSS on RA. Hypertensive. SB w/ PACS and episodic junctional rhythm, HRs mostly 40s-50s. Denies pain, lighheadness or dizziness. Regular diet, good appetite. BG ACHS, elevated in 300s. Adequate UOP, c/o vaginal itchining. LBM 3/7. Up ad nicko. Showered.    P: Tikosyn currently held. Needs EKG 2-3hrs after med administration. Monitor rhythm/ HR. Diabetes educator consult pending.     Hours of care: [phone]

## 2023-03-08 NOTE — PROGRESS NOTES
Diabetes Consult Daily  Progress Note          Assessment/Plan:       Ariana Palomino is a 67 y.o.Female with a PMhx of persistent atrial fibrillation, Hypertension, and type 2 diabetes who is admitted for planned dofetilide initiation.   Initially consulted for assessment of alternative to metformin (interacts with dofetilide) and for A1C 13%.  Did not tolerate dofetilide so will be able to resume metformin.    Post-prandial BG to 300s last evening.    - empagliflozin 10 mg qAM resumed   - aspart 1 unit per 15 grams carb added this morning  - aspart correction AC/HS  - glargine 15 units at HS  - hypoglycemia protocol  - education needs : outpatient since not going home on injectable and has finger stick meter.         Plan discussed with patient, bedside RN, and primary team.  Plan and info on LE amputation risk discussed w/ pt and pasted into AVS    Diabetes plan recommendation:     Check glucose two times per day, before breakfast and in the evening (make note if the check is before or after eating)  Take metformin 1 gm twice per day with meals (starting 3/8 evening)  Take Jardiance 25 mg daily (mainly for heart failure, but also improves your glucose control)(starting 3/9 morning, since already received today in hospital)     Follow up with Dr. Cook within a week to review your blood glucose.  Additional options can be considered:  -Victoza daily injection (35.00 copay)  -Glucose sensor Freestyle Monet 2 (35.00 copay)     Your hemoglobin A1C was dangerously high.  An appropriate goal for you is in the 7% range.        Lab Results   Component Value Date     A1C 13.0 03/06/2023      With improved glucose control you will have more energy and decrease your risk for complications.  If these are your goals, you can partner with your care team to find the best medications, tools and habits.                   Interval History:     The last 24 hours progress and nursing notes  "reviewed.  Post-prandial spike to 300s with jardiance 10 and glargine 15 units.  eGFR >45.        Ariana still feels tired.  Maybe disappointed aobut the outcome of her hospitalization.  Asks for confirmation that she's not leaving with insulin.  Asks about safety of insulin.    Ariana is receptive to the recommendations.  Says she thinks her A1C would be 6%.  I suggest she aim for 7% first, then 6% if can get there without increased low BG risk.  She asks about the Jardiance dosing.  Explained primary benefit at this time heart failure risk reduction but also benefit for glucose at the increased dose.  At only 10 mg yesterday insufficient to manage the post-prandial needs.  She thinks she can follow up with moy but uncertain about her Covid isolation. Discussed the benefits of virtual care for diabetes in this situation.    She does have strips at home to test BG more often.  Made her aware of costs for Victoza and sensor    Recent Labs   Lab 03/08/23  0752 03/08/23  0724 03/07/23  2120 03/07/23  1811 03/07/23  1315 03/07/23  0902   * 118* 354* 312* 179* 194*           Nutrition:     Orders Placed This Encounter      Regular Diet Adult      Diet    no        PTA Regimen:   Metformin 1 gm BID and BG 2x/weeki            Review of Systems:   See interval hx          Medications:   no steroid         Physical Exam:     Gen: Alert, in NAD   HEENT:  hearing intact to conversational volume  Resp: Unlabored  Neuro: oriented x3, communicating clearly  Psych: calm, even mood, engaged  BP (!) 155/83 (BP Location: Right arm)   Pulse (!) 42   Temp 97.8  F (36.6  C) (Oral)   Resp 16   Ht 1.626 m (5' 4\")   Wt 89.9 kg (198 lb 4.8 oz)   SpO2 100%   BMI 34.04 kg/m               Data:     Lab Results   Component Value Date    A1C 13.0 03/06/2023          No results found for: HEBMP, [id number], CREAT      CBC RESULTS:   Recent Labs   Lab Test 03/06/23  1523   WBC 3.5*   RBC 5.76*   HGB 14.3   HCT 46.8   MCV 81   MCH " 24.8*   MCHC 30.6*   RDW 15.2*   *     Recent Labs   Lab Test 03/08/23  0752 03/08/23  0724 03/07/23  0902 03/07/23  0656   NA  --  141  --  137   POTASSIUM  --  4.4  --  3.9   CHLORIDE  --  106  --  102   CO2  --  27  --  26   ANIONGAP  --  8  --  9   * 118*   < > 227*   BUN  --  28.5*  --  19.5   CR  --  1.23*  --  1.11*   MCKAYLA  --  9.3  --  8.9    < > = values in this interval not displayed.     GFR Estimate   Date Value Ref Range Status   03/08/2023 47 (L) >60 mL/min/1.73m2 Final     Comment:     eGFR calculated using 2021 CKD-EPI equation.   03/07/2023 54 (L) >60 mL/min/1.73m2 Final     Comment:     eGFR calculated using 2021 CKD-EPI equation.   03/06/2023 55 (L) >60 mL/min/1.73m2 Final     Comment:     eGFR calculated using 2021 CKD-EPI equation.     No results found for: GFRESTBLACK    Liver Function Studies -   Recent Labs   Lab Test 10/25/22  1311   PROTTOTAL 7.4   ALBUMIN 4.4   BILITOTAL 0.6   ALKPHOS 54   AST 18   ALT 19     No results found for: INR    No results found for: HEBMP, COMCBC      55 minutes spent on the date of the encounter doing chart review, history and exam, coordinating care/communication, documentation and further activities per the note.          Laura FLORES CNS   To contact Endocrine Diabetes service:   From 8AM-4PM: page inpatient diabetes provider that is following the patient that day (see filed or incomplete progress notes/consult notes).  If uncertain of provider assignment: page job code [phone].  For questions or updates from 4PM-8AM: page the diabetes job code for on call fellow: [phone]    Please notify inpatient diabetes service if changes are planned that will impact glycemia, such as changes to steroids, enteral feeding, parenteral feeding, dextrose fluids or procedures requiring prolonged NPO status.

## 2023-03-08 NOTE — PLAN OF CARE
Admitted for planned dofetilide initiation. 1st dose given (3/6). PMHx of persistent atrial fibrillation, Hypertension    Code status: Full Code     Team: Cards 1     Neuro: AOx4, Calm and cooperative, no deficits  Cardiac/Tele: SB/Junctional with PAC's (Lows of 38 bpm). Pt denies CP or palpitations and any other symptoms. BP elevated  (SBP's 140's-170's). Get 12 Lead EKG 2-3 hrs after dofetilide administration  Respiratory: Room air, O2 sats > 92%. No SOB noted. LS clear dim on the bases  GI/: Voids spontaneously. BS audible and normoactive, LBM (3/7)  Diet/Appetite: Regular diet  Skin: No overt deficits  Endocrine: BG checks ACHS  LDAs: PIV saline locked  Activity: Up ad nicko  Pain: Denies pain    Plan: Continue to closely monitor Pt's status. Notify team of concerns

## 2023-03-08 NOTE — PROGRESS NOTES
DISCHARGE                         3/8/2023  2:32 PM  ----------------------------------------------------------------------------  Discharged to: Home  Via: private transportation  Accompanied by: Daughter  Discharge Instructions: diet, activity, medications, follow up appointments, when to call the MD, aftercare instructions.  Prescriptions: To be filled by [place] discharge pharmacy; medication list reviewed & sent with pt  Follow Up Appointments: arranged; information given  Belongings: All sent with pt  IV: d/c'd  Telemetry: d/c'd  Pt exhibits understanding of above discharge instructions; all questions answered.    Discharge Paperwork: Signed, copied, and sent home with patient.

## 2023-03-08 NOTE — PROGRESS NOTES
Diabetes plan recommendation:    Check glucose two times per day, before breakfast and in the evening (make note if the check is before or after eating)  Take metformin 1 gm twice per day with meals  Take Jardiance 25 mg daily (mainly for heart failure, but also improves your glucose control)    Follow up with Dr. Cook within a week to review your blood glucose.  Additional options can be considered:  -Victoza daily injection (35.00 copay)  -Glucose sensor Freestyle Monet 2 (35.00 copay)    Your hemoglobin A1C was dangerously high.  An appropriate goal for you is in the 7% range.  Lab Results   Component Value Date    A1C 13.0 03/06/2023     With improved glucose control you will have more energy and decrease your risk for complications.  If these are your goals, you can partner with your care team to find the best medications, tools and habits.

## 2023-03-08 NOTE — DISCHARGE SUMMARY
Cardiology Discharge Note      Assessment & Plan:  Ariana Palomino is a 67 year old female with a history of persistent atrial fibrillation, Hypertension, T2DM who is admitted for planned dofetilide initiation. Unfortunately patient's QTc was elongated on lowest dose of dofetilide therefore patient was discharged with plan for close EP follow up for further medical management of her atrial fibrillation    # Persistent atrial fibrillation  # Dofetilide initiation  # Bradycardia  # Junctional Rhythm  Persistent atrial fibrillation since 10/2022 s/p DCCV 11/2022 with brief conversion to Sinus mayank (HR 40's). Pt continues to have palpitations and dyspnea. Admitted for planned dofetilide initiation.    - Patient converted to junctional rhythm rates 30-50 after 1st dose of dofetilide, QTC > 500 so dose decreased to 125 mcg BID. Patient received 1 additional dose of dofetilide at 125 mcg with repeat EKG showing prolonged QTc. Unfortunately dofetilide was not tolerated by the patient and will be discontinued.   - Anticoagulation: CHADSVASC 4, Continue PTA Xarelto   - Rate control: Hold Toprol 50 mg daily d/t bradycardia, junctional rhythm  - follow up with Dr. Arizmendi in 1 month for further EP options for management of pAF     Type 2 DM:  - A1C on admission 13%,   - Endocrinology consult with following recommendations   Continue PTA metformin 1,000 mg BID   Start Jardiance 25 mg daily OR Jardiance 10 mg AND Victoza  - Patient does not want to do injections at this time, will trial Jardiance 25 daily  - Recommend close PCP follow up (within 1 week) of medication tolerance/blood sugar control     Incidental COVID-19 infection  - COVID-19 positive on admission, asymptomatic     Chronic Conditions:  - HTN: continue PTA Lisinopril 40 mg daily    Consults:  Endocrinology    Medication Changes:  START Jardiance 25 mg daily   STOP Toprol XL 25 mg     Discharge medications:   Current Discharge Medication List      START taking these  "medications    Details   empagliflozin (JARDIANCE) 25 MG TABS tablet Take 1 tablet (25 mg) by mouth daily  Qty: 90 tablet, Refills: 3    Associated Diagnoses: Acute HFrEF (heart failure with reduced ejection fraction) (H)         CONTINUE these medications which have CHANGED    Details   lisinopril (ZESTRIL) 40 MG tablet Take 1 tablet (40 mg) by mouth daily  Qty: 90 tablet, Refills: 3    Associated Diagnoses: Essential hypertension      XARELTO ANTICOAGULANT 20 MG TABS tablet Take 1 tablet (20 mg) by mouth daily (with dinner)  Qty: 90 tablet, Refills: 3    Associated Diagnoses: Paroxysmal atrial fibrillation (H)         CONTINUE these medications which have NOT CHANGED    Details   metFORMIN (GLUCOPHAGE) 1000 MG tablet TAKE 1 TABLET BY MOUTH TWICE A DAY WITH A MEAL         STOP taking these medications       canagliflozin (INVOKANA) 100 MG tablet Comments:   Reason for Stopping:         metoprolol succinate ER (TOPROL XL) 25 MG 24 hr tablet Comments:   Reason for Stopping:               Follow-up:  PCP 1 week  Dr. Arizmendi (EP) 1 month    Labs or imaging requiring follow-up after discharge:  [facility]  EKG    Code status:  FULL    Most recent vital signs:  BP (!) 155/83 (BP Location: Right arm)   Pulse (!) 42   Temp 97.8  F (36.6  C) (Oral)   Resp 16   Ht 1.626 m (5' 4\")   Wt 89.9 kg (198 lb 4.8 oz)   SpO2 100%   BMI 34.04 kg/m    Temp:  [97.4  F (36.3  C)-98.5  F (36.9  C)] 97.8  F (36.6  C)  Pulse:  [42-55] 42  Resp:  [16] 16  BP: (120-171)/(67-88) 155/83  SpO2:  [98 %-100 %] 100 %  Wt Readings from Last 2 Encounters:   03/07/23 89.9 kg (198 lb 4.8 oz)   02/14/23 92.9 kg (204 lb 12.8 oz)     Intake/Output Summary (Last 24 hours) at 3/8/2023 0643  Last data filed at 3/8/2023 0100  Gross per 24 hour   Intake 2140 ml   Output 700 ml   Net 1440 ml     Physical exam:  General: Pleasant elderly female. Appears comfortable and in no acute distress. Alert and interactive  HEENT: Normocephalic, atraumatic. No scleral icterus " or injection  Neck: JVP not elevated  CARDIAC: Regular rate and rhythm, no m/r/g appreciated. Peripheral pulses 2+  RESP: Normal work of breathing on room air without use of accessory breathing muscles. Clear to auscultation in all fields. No wheezes, rhonchi or crackles appreciated.  GI: No abdominal distention. Soft and nontender.   EXTREMITIES: Without lower extremity edema. No cyanosis or clubbing. Warm and well perfused. No venous stasis changes.   SKIN: No acute lesions appreciated. Warm and dry to touch  NEURO: Alert and oriented X3, CN II-XII grossly intact, no focal neurological deficits noted, normal speech  PSYCH: Mood and affect are appropriate    Labs (Past three days):  CBC  Recent Labs   Lab 03/06/23  1523   WBC 3.5*   RBC 5.76*   HGB 14.3   HCT 46.8   MCV 81   MCH 24.8*   MCHC 30.6*   RDW 15.2*   *     BMP  Recent Labs   Lab 03/08/23  0752 03/08/23  0724 03/07/23  2120 03/07/23  1811 03/07/23  0902 03/07/23  0656 03/06/23  1828 03/06/23  1523   NA  --  141  --   --   --  137  --  136   POTASSIUM  --  4.4  --   --   --  3.9  --  4.0   CHLORIDE  --  106  --   --   --  102  --  101   CO2  --  27  --   --   --  26  --  24   ANIONGAP  --  8  --   --   --  9  --  11   * 118* 354* 312*   < > 227*   < > 481*   BUN  --  28.5*  --   --   --  19.5  --  21.2   CR  --  1.23*  --   --   --  1.11*  --  1.08*   GFRESTIMATED  --  47*  --   --   --  54*  --  55*   MCKAYLA  --  9.3  --   --   --  8.9  --  9.2   MAG  --  2.2  --   --   --  2.0  --  1.9    < > = values in this interval not displayed.       EKG 2/14/2023: Atrial Fibrillation,        EKG 3/7/23 Junctional Rhythm rate 47:    TTE 10/25/2022:  Interpretation Summary  Left ventricular function is decreased. The ejection fraction is 50-55%  (borderline).  Arrythmia and away-co-okim variation is present limiting accurate assessment  of the LV function, the LV function appears low normal.  Global right ventricular function is mildly  reduced.  Severe left atrial enlargement is present.  IVC diameter and respiratory changes fall into an intermediate range  suggesting an RA pressure of 8 mmHg.     This study was compared with the study from 11/18/2020. The left ventricular  function has worsened.    Time Spent on this Encounter   I, MARK Huber CNP, personally saw the patient today and spent greater than 30 minutes discharging this patient.    >30 minutes spent in discharge, including >50% in counseling and coordination of care, medication review and plan of care recommended on follow up. Questions were answered.   It was our pleasure to care for Ariana Palomino during this hospitalization. Please do not hesitate to contact me should there be questions regarding the hospital course or discharge plan.    Patient discussed with staff cardiologist, Dr. Arizmendi, who agrees with the above documentation and plan. Documentation represents joint decision making.     MARK Zuniga, CNP  [facility]

## 2023-03-13 ENCOUNTER — LAB REQUISITION (OUTPATIENT)
Dept: LAB | Facility: CLINIC | Age: 68
End: 2023-03-13
Payer: COMMERCIAL

## 2023-03-13 DIAGNOSIS — I48.0 PAROXYSMAL ATRIAL FIBRILLATION (H): ICD-10-CM

## 2023-03-13 DIAGNOSIS — R42 DIZZINESS AND GIDDINESS: ICD-10-CM

## 2023-03-13 LAB
ANION GAP SERPL CALCULATED.3IONS-SCNC: 14 MMOL/L (ref 7–15)
BUN SERPL-MCNC: 20.4 MG/DL (ref 8–23)
CALCIUM SERPL-MCNC: 9.8 MG/DL (ref 8.8–10.2)
CHLORIDE SERPL-SCNC: 102 MMOL/L (ref 98–107)
CREAT SERPL-MCNC: 1.45 MG/DL (ref 0.51–0.95)
DEPRECATED HCO3 PLAS-SCNC: 22 MMOL/L (ref 22–29)
GFR SERPL CREATININE-BSD FRML MDRD: 39 ML/MIN/1.73M2
GLUCOSE SERPL-MCNC: 172 MG/DL (ref 70–99)
POTASSIUM SERPL-SCNC: 4.5 MMOL/L (ref 3.4–5.3)
SODIUM SERPL-SCNC: 138 MMOL/L (ref 136–145)

## 2023-03-13 PROCEDURE — 80048 BASIC METABOLIC PNL TOTAL CA: CPT | Performed by: INTERNAL MEDICINE

## 2023-03-21 ENCOUNTER — LAB REQUISITION (OUTPATIENT)
Dept: LAB | Facility: CLINIC | Age: 68
End: 2023-03-21
Payer: COMMERCIAL

## 2023-03-21 DIAGNOSIS — E11.65 TYPE 2 DIABETES MELLITUS WITH HYPERGLYCEMIA (H): ICD-10-CM

## 2023-03-21 DIAGNOSIS — R20.2 PARESTHESIA OF SKIN: ICD-10-CM

## 2023-03-21 DIAGNOSIS — I48.0 PAROXYSMAL ATRIAL FIBRILLATION (H): ICD-10-CM

## 2023-03-21 DIAGNOSIS — R42 DIZZINESS AND GIDDINESS: ICD-10-CM

## 2023-03-21 DIAGNOSIS — R20.0 ANESTHESIA OF SKIN: ICD-10-CM

## 2023-03-21 LAB
ANION GAP SERPL CALCULATED.3IONS-SCNC: 14 MMOL/L (ref 7–15)
BUN SERPL-MCNC: 24.9 MG/DL (ref 8–23)
CALCIUM SERPL-MCNC: 9.2 MG/DL (ref 8.8–10.2)
CHLORIDE SERPL-SCNC: 106 MMOL/L (ref 98–107)
CHOLEST SERPL-MCNC: 156 MG/DL
CREAT SERPL-MCNC: 1.29 MG/DL (ref 0.51–0.95)
DEPRECATED HCO3 PLAS-SCNC: 22 MMOL/L (ref 22–29)
GFR SERPL CREATININE-BSD FRML MDRD: 45 ML/MIN/1.73M2
GLUCOSE SERPL-MCNC: 127 MG/DL (ref 70–99)
HDLC SERPL-MCNC: 53 MG/DL
LDLC SERPL CALC-MCNC: 87 MG/DL
NONHDLC SERPL-MCNC: 103 MG/DL
POTASSIUM SERPL-SCNC: 4.3 MMOL/L (ref 3.4–5.3)
SODIUM SERPL-SCNC: 142 MMOL/L (ref 136–145)
TRIGL SERPL-MCNC: 79 MG/DL
TSH SERPL DL<=0.005 MIU/L-ACNC: 1.58 UIU/ML (ref 0.3–4.2)
VIT B12 SERPL-MCNC: 494 PG/ML (ref 232–1245)

## 2023-03-21 PROCEDURE — 80048 BASIC METABOLIC PNL TOTAL CA: CPT | Mod: ORL | Performed by: INTERNAL MEDICINE

## 2023-03-21 PROCEDURE — 82607 VITAMIN B-12: CPT | Mod: ORL | Performed by: INTERNAL MEDICINE

## 2023-03-21 PROCEDURE — 80061 LIPID PANEL: CPT | Mod: ORL | Performed by: INTERNAL MEDICINE

## 2023-03-21 PROCEDURE — 84443 ASSAY THYROID STIM HORMONE: CPT | Mod: ORL | Performed by: INTERNAL MEDICINE

## 2023-04-11 ENCOUNTER — OFFICE VISIT (OUTPATIENT)
Dept: CARDIOLOGY | Facility: CLINIC | Age: 68
End: 2023-04-11
Payer: COMMERCIAL

## 2023-04-11 VITALS
HEART RATE: 54 BPM | BODY MASS INDEX: 34.06 KG/M2 | DIASTOLIC BLOOD PRESSURE: 89 MMHG | OXYGEN SATURATION: 99 % | SYSTOLIC BLOOD PRESSURE: 182 MMHG | WEIGHT: 198.4 LBS

## 2023-04-11 DIAGNOSIS — R00.1 SINUS BRADYCARDIA: ICD-10-CM

## 2023-04-11 DIAGNOSIS — I48.0 PAROXYSMAL ATRIAL FIBRILLATION (H): Primary | ICD-10-CM

## 2023-04-11 DIAGNOSIS — I10 ESSENTIAL HYPERTENSION: ICD-10-CM

## 2023-04-11 DIAGNOSIS — E11.65 UNCONTROLLED TYPE 2 DIABETES MELLITUS WITH HYPERGLYCEMIA (H): ICD-10-CM

## 2023-04-11 PROCEDURE — 93000 ELECTROCARDIOGRAM COMPLETE: CPT | Performed by: INTERNAL MEDICINE

## 2023-04-11 PROCEDURE — 99215 OFFICE O/P EST HI 40 MIN: CPT | Performed by: INTERNAL MEDICINE

## 2023-04-11 RX ORDER — METOPROLOL SUCCINATE 25 MG/1
25 TABLET, EXTENDED RELEASE ORAL DAILY
COMMUNITY

## 2023-04-11 NOTE — NURSING NOTE
Ariana Palomino's goals for this visit include:   Chief Complaint   Patient presents with     Follow Up     Atrial Fib       She requests these members of her care team be copied on today's visit information: yes     PCP: Oma Cook    Referring Provider:  No referring provider defined for this encounter.    BP (!) 182/89 (BP Location: Left arm, Patient Position: Chair, Cuff Size: Adult Large)   Pulse 54   Wt 90 kg (198 lb 6.4 oz)   SpO2 99%   BMI 34.06 kg/m      Do you need any medication refills at today's visit? No       ZIGGY Ferguson   Cardiology Team  [facility]

## 2023-04-11 NOTE — PROGRESS NOTES
I am delighted to see Ariana Palomino at the [place] cardiology clinic for follow up of atrial fibrillation and bradycardia.     The patient is a 67 year old  female with h/o atrial fibrillation diagnosed when she presented to ED 10/25/2022 with 3 weeks of palpitations and dyspnea. Found to be in AF with rates 110 bpm and /105.  She was started on metoprolol XL 25 mg every day, started Xarelto daily, and underwent an outpatient MARY/CV 11/8/2022 to sinus bradycardia. She was told to stop metoprolol after the CV due to sinus mayank in the 40s. She said it lasted a day before she had recurrent palpitations and dyspnea with climbing stairs. When I first met her 1/3/2023 she was in AF at 137 bpm. I resumed metoprolol succinate 50 every day, saw her back in follow up to assess compliance, HR was improved to 115 bpm and she reported compliance with Xarelto. Her AZEEM was 53. To maintain sinus would require antiarrhythmic drug. Given her baseline bradycardia, I arranged a hospital admission for dofetilide initiation.    She came to hospital 3/6/2023 in AF at 96 bpm, QTc was 497 ms, GFR was 55, dofetilide was started at 250 mcg bid. She converted to sinus mayank/junctional mayank at 47 bpm with QTc 580 ms after one dose of dofetilide. She also tested positive for COVID although she was asymptomatic. Dofetilide was stopped and she was discharge on 3/8/2023, no metoprolol and no dofetilide.    She saw PCP 3/13/2023 - HR  bpm (no EKG), metoprolol succinate 50 mg every day was started. Follow up PCP 3/21/2023, reported dizziness, HR was 72, /81,  metoprolol succinate reduced to 25 every day. Patient says she's doing well. She has no palpitations, no lightheadedness. Her main compliant is numbness in her feet.      Past Medical History:  1. Atrial fibrillation, initially presented 11/3/2020 in setting of COVID+ with symptoms, spontaneously converted to sinus. Recurrent AF s/p MARY/CV 11/8/2022  2. Hypertension - her  lisinopril was increased from 20 to 40 in Aug 2022 per PCP note, but she's still only taking 20 every day.  3. Diabetes type II - Invokana added in July 2022 but patient did not want to take it. Empagliflozin started in hospital March 2023.      Allergies:  No Known Allergies    Medications:   Metoprolol succinate 25 qd  Xarelto 20 every day  Lisinopril 40 every day  Metformin 1000 bid  Empagliflozin 25 qd       Physical examination  Vitals: BP (!) 182/89 (BP Location: Left arm, Patient Position: Chair, Cuff Size: Adult Large)   Pulse 54   Wt 90 kg (198 lb 6.4 oz)   SpO2 99%   BMI 34.06 kg/m    BMI= Body mass index is 34.06 kg/m .( 90 kg)    Constitutional: In general, the patient is a pleasant female in no acute distress.    Targeted cardiovascular exam:  Regular, mayank.  Normal S1, S2. No murmur, rub, click, or gallop.   Extremities:Pulses are normal bilaterally throughout. No peripheral edema.  Respiratory: Clear to asculation.      I have personally and independently reviewed the following:  Labs:  3/21/2023: TSH 1.58, cr 1.29 (CCL 69 ml/min)  3/13/2023: hgb 17, plt 190K  3/6/2023: A1C 13    Echo 10/25/2022 (in AF): EF 50-55%, RA 8, no sig valve disease, AZEEM 52.9    Patch monitor 11/18-12/2/2020: sinus average 62 bpm, no AF    EKG:   TODAY 4/11/2023: sinus 49 bpm, LVH with repolarization abnormality;  ms  3/7/2023: sinus mayank 43 bpm,  ms  2/14/2023:  bpm, QTc 450 ms  1/10/2023:  bpm  11/8/2022:  bpm (pre CV)  11/8/2022: sinus mayank 46 bpm with PACs (post CV)    Assessment :  1. Atrial fibrillation, paroxysmal. She remains in sinus today without any antiarrhythmic drugs. Her sinus rate is in the 40-50s. Currently tolerating metoprolol succinate at 25 mg qday but she should not take any more than that. If she has recurrent symptomatic AF and requires antiarrhythmic drugs, will likely need a pacemaker implant. [id number] score is 3 for age, female, HTN, DM. On appropriate dose  of Xarelto 20 mg every day.  2. HTN, not controlled. Another agent should be considered. She had previously been on amlodipine, will defer to PCP.  3. Diabetes type II, A1C 13. Not on insulin. She has symptoms suggestive of peripheral neuropathy. Follow up with PCP, although many meds had been suggested to her before and she's been reluctant.    Plan:  Continue current meds.  See PCP for BP and DM management.  I will see her back in 6 month.  If she needs antiarrhythmic drugs, a pacemaker may be needed.  Metoprolol succinate dose should not be any more than 25 mg every day        I spent a total of 20 minutes face to face with  Ariana Palomino during today's office visit. I have spent an additional 20 minutes today on chart review and documentation.  =    The patient is to return  as above. The patient understood the treatment plan as outlined above.  There were no barriers to learning.      Kristina Arizmendi MD

## 2023-04-11 NOTE — PATIENT INSTRUCTIONS
Take your medicines every day, as directed     Changes made today:  None     return in 6 months    Cardiology Care Coordinators:      Cheryl HAYWODO RN     Cardiology Rooming staff:  Kane TRINH    Phone  [phone]      Fax [phone]    To Contact us     During Business Hours:  [phone]     If you are needing refills please contact your pharmacy.     For urgent after hour care please call the [facility] at [phone] or the [facility] at [phone] and ask to speak to the cardiologist on call.            HOW TO CHECK YOUR BLOOD PRESSURE AT HOME:     Avoid eating, smoking, and exercising for at least 30 minutes before taking a reading.     Be sure you have taken your BP medication at least 2-3 hours before you check it.      Sit quietly for 10 minutes before a reading.      Sit in a chair with your feet flat on the floor. Rest your  arm on a table so that the arm cuff is at the same level as your heart.     Remain still during the reading.  Record your blood pressure and pulse in a log and bring to your next appointment.       Use [employer] allows you to communicate directly with your heart team through secure messaging.  [employer] can be accessed any time on your phone, computer, or tablet.  If you need assistance, we'd be happy to help!             Keep your Heart Appointments:

## 2023-06-19 ENCOUNTER — APPOINTMENT (OUTPATIENT)
Dept: CARDIOLOGY | Facility: CLINIC | Age: 68
End: 2023-06-19
Payer: COMMERCIAL

## 2023-06-19 ENCOUNTER — APPOINTMENT (OUTPATIENT)
Dept: GENERAL RADIOLOGY | Facility: CLINIC | Age: 68
End: 2023-06-19
Attending: FAMILY MEDICINE
Payer: COMMERCIAL

## 2023-06-19 ENCOUNTER — HOSPITAL ENCOUNTER (EMERGENCY)
Facility: CLINIC | Age: 68
Discharge: HOME | End: 2023-06-19
Attending: FAMILY MEDICINE | Admitting: FAMILY MEDICINE
Payer: COMMERCIAL

## 2023-06-19 VITALS
RESPIRATION RATE: 19 BRPM | TEMPERATURE: 97.8 F | OXYGEN SATURATION: 98 % | DIASTOLIC BLOOD PRESSURE: 91 MMHG | SYSTOLIC BLOOD PRESSURE: 139 MMHG | HEART RATE: 108 BPM

## 2023-06-19 DIAGNOSIS — I10 ESSENTIAL HYPERTENSION: ICD-10-CM

## 2023-06-19 DIAGNOSIS — Z79.01 CURRENT USE OF LONG TERM ANTICOAGULATION: ICD-10-CM

## 2023-06-19 DIAGNOSIS — I48.91 ATRIAL FIBRILLATION WITH RVR (H): ICD-10-CM

## 2023-06-19 DIAGNOSIS — I48.0 PAROXYSMAL ATRIAL FIBRILLATION (H): ICD-10-CM

## 2023-06-19 LAB
ALBUMIN SERPL BCG-MCNC: 4.4 G/DL (ref 3.5–5.2)
ALP SERPL-CCNC: 83 U/L (ref 35–104)
ALT SERPL W P-5'-P-CCNC: 16 U/L (ref 0–50)
ANION GAP SERPL CALCULATED.3IONS-SCNC: 15 MMOL/L (ref 7–15)
APTT PPP: 24 SECONDS (ref 22–38)
AST SERPL W P-5'-P-CCNC: 24 U/L (ref 0–45)
ATRIAL RATE - MUSE: 150 BPM
BASOPHILS # BLD AUTO: 0 10E3/UL (ref 0–0.2)
BASOPHILS NFR BLD AUTO: 1 %
BILIRUB SERPL-MCNC: 0.5 MG/DL
BUN SERPL-MCNC: 13.9 MG/DL (ref 8–23)
CALCIUM SERPL-MCNC: 9.8 MG/DL (ref 8.8–10.2)
CHLORIDE SERPL-SCNC: 101 MMOL/L (ref 98–107)
CREAT SERPL-MCNC: 0.96 MG/DL (ref 0.51–0.95)
DEPRECATED HCO3 PLAS-SCNC: 22 MMOL/L (ref 22–29)
DIASTOLIC BLOOD PRESSURE - MUSE: NORMAL MMHG
EOSINOPHIL # BLD AUTO: 0.1 10E3/UL (ref 0–0.7)
EOSINOPHIL NFR BLD AUTO: 1 %
ERYTHROCYTE [DISTWIDTH] IN BLOOD BY AUTOMATED COUNT: 14.9 % (ref 10–15)
GFR SERPL CREATININE-BSD FRML MDRD: 63 ML/MIN/1.73M2
GLUCOSE SERPL-MCNC: 341 MG/DL (ref 70–99)
HCT VFR BLD AUTO: 49.4 % (ref 35–47)
HGB BLD-MCNC: 15.5 G/DL (ref 11.7–15.7)
IMM GRANULOCYTES # BLD: 0 10E3/UL
IMM GRANULOCYTES NFR BLD: 1 %
INR BLD: 1.1 (ref 2–3)
INR PPP: 1.02 (ref 0.85–1.15)
INTERPRETATION ECG - MUSE: NORMAL
LVEF ECHO: NORMAL
LYMPHOCYTES # BLD AUTO: 1.6 10E3/UL (ref 0.8–5.3)
LYMPHOCYTES NFR BLD AUTO: 29 %
MCH RBC QN AUTO: 25.4 PG (ref 26.5–33)
MCHC RBC AUTO-ENTMCNC: 31.4 G/DL (ref 31.5–36.5)
MCV RBC AUTO: 81 FL (ref 78–100)
MONOCYTES # BLD AUTO: 0.6 10E3/UL (ref 0–1.3)
MONOCYTES NFR BLD AUTO: 10 %
NEUTROPHILS # BLD AUTO: 3.2 10E3/UL (ref 1.6–8.3)
NEUTROPHILS NFR BLD AUTO: 58 %
NRBC # BLD AUTO: 0 10E3/UL
NRBC BLD AUTO-RTO: 0 /100
NT-PROBNP SERPL-MCNC: 856 PG/ML (ref 0–900)
P AXIS - MUSE: NORMAL DEGREES
PLATELET # BLD AUTO: 152 10E3/UL (ref 150–450)
POTASSIUM SERPL-SCNC: 3.6 MMOL/L (ref 3.4–5.3)
PR INTERVAL - MUSE: NORMAL MS
PROT SERPL-MCNC: 7.9 G/DL (ref 6.4–8.3)
QRS DURATION - MUSE: 92 MS
QT - MUSE: 318 MS
QTC - MUSE: 512 MS
R AXIS - MUSE: 25 DEGREES
RBC # BLD AUTO: 6.11 10E6/UL (ref 3.8–5.2)
SODIUM SERPL-SCNC: 138 MMOL/L (ref 136–145)
SYSTOLIC BLOOD PRESSURE - MUSE: NORMAL MMHG
T AXIS - MUSE: 211 DEGREES
TROPONIN T BLD-MCNC: 0.01 UG/L
TROPONIN T SERPL HS-MCNC: 24 NG/L
TSH SERPL DL<=0.005 MIU/L-ACNC: 1.09 UIU/ML (ref 0.3–4.2)
VENTRICULAR RATE- MUSE: 156 BPM
WBC # BLD AUTO: 5.5 10E3/UL (ref 4–11)

## 2023-06-19 PROCEDURE — 84484 ASSAY OF TROPONIN QUANT: CPT

## 2023-06-19 PROCEDURE — 96361 HYDRATE IV INFUSION ADD-ON: CPT | Performed by: FAMILY MEDICINE

## 2023-06-19 PROCEDURE — 71045 X-RAY EXAM CHEST 1 VIEW: CPT | Mod: 26 | Performed by: RADIOLOGY

## 2023-06-19 PROCEDURE — 255N000002 HC RX 255 OP 636: Performed by: INTERNAL MEDICINE

## 2023-06-19 PROCEDURE — 999N000208 ECHOCARDIOGRAM COMPLETE

## 2023-06-19 PROCEDURE — 93005 ELECTROCARDIOGRAM TRACING: CPT | Performed by: FAMILY MEDICINE

## 2023-06-19 PROCEDURE — 85610 PROTHROMBIN TIME: CPT | Performed by: FAMILY MEDICINE

## 2023-06-19 PROCEDURE — 36415 COLL VENOUS BLD VENIPUNCTURE: CPT | Performed by: FAMILY MEDICINE

## 2023-06-19 PROCEDURE — 83880 ASSAY OF NATRIURETIC PEPTIDE: CPT | Performed by: FAMILY MEDICINE

## 2023-06-19 PROCEDURE — 85610 PROTHROMBIN TIME: CPT

## 2023-06-19 PROCEDURE — 258N000003 HC RX IP 258 OP 636: Performed by: FAMILY MEDICINE

## 2023-06-19 PROCEDURE — 99214 OFFICE O/P EST MOD 30 MIN: CPT | Mod: 25 | Performed by: INTERNAL MEDICINE

## 2023-06-19 PROCEDURE — 93010 ELECTROCARDIOGRAM REPORT: CPT | Performed by: FAMILY MEDICINE

## 2023-06-19 PROCEDURE — 84443 ASSAY THYROID STIM HORMONE: CPT | Performed by: FAMILY MEDICINE

## 2023-06-19 PROCEDURE — 71045 X-RAY EXAM CHEST 1 VIEW: CPT

## 2023-06-19 PROCEDURE — 80053 COMPREHEN METABOLIC PANEL: CPT | Performed by: FAMILY MEDICINE

## 2023-06-19 PROCEDURE — 99285 EMERGENCY DEPT VISIT HI MDM: CPT | Mod: 25 | Performed by: FAMILY MEDICINE

## 2023-06-19 PROCEDURE — 85730 THROMBOPLASTIN TIME PARTIAL: CPT | Performed by: FAMILY MEDICINE

## 2023-06-19 PROCEDURE — 84484 ASSAY OF TROPONIN QUANT: CPT | Performed by: FAMILY MEDICINE

## 2023-06-19 PROCEDURE — 85025 COMPLETE CBC W/AUTO DIFF WBC: CPT | Performed by: FAMILY MEDICINE

## 2023-06-19 PROCEDURE — 96374 THER/PROPH/DIAG INJ IV PUSH: CPT | Performed by: FAMILY MEDICINE

## 2023-06-19 PROCEDURE — 93306 TTE W/DOPPLER COMPLETE: CPT | Mod: 26 | Performed by: INTERNAL MEDICINE

## 2023-06-19 PROCEDURE — 250N000009 HC RX 250: Performed by: STUDENT IN AN ORGANIZED HEALTH CARE EDUCATION/TRAINING PROGRAM

## 2023-06-19 RX ORDER — NITROGLYCERIN 0.4 MG/1
0.4 TABLET SUBLINGUAL EVERY 5 MIN PRN
Status: CANCELLED | OUTPATIENT
Start: 2023-06-19

## 2023-06-19 RX ORDER — ONDANSETRON 2 MG/ML
4 INJECTION INTRAMUSCULAR; INTRAVENOUS EVERY 6 HOURS PRN
Status: CANCELLED | OUTPATIENT
Start: 2023-06-19

## 2023-06-19 RX ORDER — AMOXICILLIN 250 MG
2 CAPSULE ORAL 2 TIMES DAILY PRN
Status: CANCELLED | OUTPATIENT
Start: 2023-06-19

## 2023-06-19 RX ORDER — ACETAMINOPHEN 650 MG/1
650 SUPPOSITORY RECTAL EVERY 4 HOURS PRN
Status: CANCELLED | OUTPATIENT
Start: 2023-06-19

## 2023-06-19 RX ORDER — AMOXICILLIN 250 MG
1 CAPSULE ORAL 2 TIMES DAILY PRN
Status: CANCELLED | OUTPATIENT
Start: 2023-06-19

## 2023-06-19 RX ORDER — LIDOCAINE 40 MG/G
CREAM TOPICAL
Status: DISCONTINUED | OUTPATIENT
Start: 2023-06-19 | End: 2023-06-19 | Stop reason: HOSPADM

## 2023-06-19 RX ORDER — BISACODYL 10 MG
10 SUPPOSITORY, RECTAL RECTAL DAILY PRN
Status: CANCELLED | OUTPATIENT
Start: 2023-06-19

## 2023-06-19 RX ORDER — ACETAMINOPHEN 325 MG/1
650 TABLET ORAL EVERY 4 HOURS PRN
Status: CANCELLED | OUTPATIENT
Start: 2023-06-19

## 2023-06-19 RX ORDER — MAGNESIUM HYDROXIDE/ALUMINUM HYDROXICE/SIMETHICONE 120; 1200; 1200 MG/30ML; MG/30ML; MG/30ML
30 SUSPENSION ORAL EVERY 4 HOURS PRN
Status: CANCELLED | OUTPATIENT
Start: 2023-06-19

## 2023-06-19 RX ORDER — METOPROLOL TARTRATE 1 MG/ML
5 INJECTION, SOLUTION INTRAVENOUS ONCE
Status: COMPLETED | OUTPATIENT
Start: 2023-06-19 | End: 2023-06-19

## 2023-06-19 RX ORDER — POLYETHYLENE GLYCOL 3350 17 G/17G
17 POWDER, FOR SOLUTION ORAL DAILY PRN
Status: CANCELLED | OUTPATIENT
Start: 2023-06-19

## 2023-06-19 RX ORDER — LIDOCAINE 40 MG/G
CREAM TOPICAL
Status: CANCELLED | OUTPATIENT
Start: 2023-06-19

## 2023-06-19 RX ORDER — ONDANSETRON 4 MG/1
4 TABLET, ORALLY DISINTEGRATING ORAL EVERY 6 HOURS PRN
Status: CANCELLED | OUTPATIENT
Start: 2023-06-19

## 2023-06-19 RX ADMIN — METOPROLOL TARTRATE 5 MG: 5 INJECTION INTRAVENOUS at 13:00

## 2023-06-19 RX ADMIN — SODIUM CHLORIDE 1000 ML: 9 INJECTION, SOLUTION INTRAVENOUS at 10:38

## 2023-06-19 RX ADMIN — HUMAN ALBUMIN MICROSPHERES AND PERFLUTREN 6 ML: 10; .22 INJECTION, SOLUTION INTRAVENOUS at 15:47

## 2023-06-19 ASSESSMENT — ACTIVITIES OF DAILY LIVING (ADL)
ADLS_ACUITY_SCORE: 35

## 2023-06-19 NOTE — LETTER
June 19, 2023      To Whom It May Concern:      Ariana Palomino was seen in our Emergency Department today, 06/19/23.  I expect her condition to improve over the next few days.  She may return to work when improved.    Sincerely,        Omar Green MD

## 2023-06-19 NOTE — DISCHARGE INSTRUCTIONS
Home per your request.  You were seen by cardiology here in the emergency room also who offered you admission for potential pacemaker placement along with other options such as cardioversion etc.  You understand this but at this point you feel fine and you have dealt with this before in the past and you would like to go home and follow-up with your outpatient cardiologist in the next couple months as planned.  Continue home medications and encourage fluids and rest.  You should return if any increasing lightheadedness chest pain shortness of breath or any other concerns at all.  Follow-up with your primary MD in the next few days for recheck.  Home with daughter.    Results for orders placed or performed during the hospital encounter of 06/19/23   XR Chest Port 1 View     Status: None    Narrative    EXAM: XR CHEST PORT 1 VIEW  6/19/2023 11:19 AM     HISTORY:  a fib rvr       COMPARISON:  10/25/2022    FINDINGS:   Portable AP upright view of the chest.    Trachea is midline. Stable enlarged cardiac silhouette. Bibasilar  streaky opacities. No focal airspace opacity, pleural effusion or  appreciable pneumothorax.    No acute osseous abnormality.  Visualized upper abdomen is  unremarkable.        Impression    IMPRESSION: Stable enlarged cardiac silhouette. No acute  cardiopulmonary abnormality.    I have personally reviewed the examination and initial interpretation  and I agree with the findings.    JOVANI RUTHERFORD DO         SYSTEM ID:  [id number]   Partial thromboplastin time     Status: Normal   Result Value Ref Range    aPTT 24 22 - 38 Seconds   INR     Status: Normal   Result Value Ref Range    INR 1.02 0.85 - 1.15   Comprehensive metabolic panel     Status: Abnormal   Result Value Ref Range    Sodium 138 136 - 145 mmol/L    Potassium 3.6 3.4 - 5.3 mmol/L    Chloride 101 98 - 107 mmol/L    Carbon Dioxide (CO2) 22 22 - 29 mmol/L    Anion Gap 15 7 - 15 mmol/L    Urea Nitrogen 13.9 8.0 - 23.0 mg/dL    Creatinine  0.96 (H) 0.51 - 0.95 mg/dL    Calcium 9.8 8.8 - 10.2 mg/dL    Glucose 341 (H) 70 - 99 mg/dL    Alkaline Phosphatase 83 35 - 104 U/L    AST 24 0 - 45 U/L    ALT 16 0 - 50 U/L    Protein Total 7.9 6.4 - 8.3 g/dL    Albumin 4.4 3.5 - 5.2 g/dL    Bilirubin Total 0.5 <=1.2 mg/dL    GFR Estimate 63 >60 mL/min/1.73m2   TSH     Status: Normal   Result Value Ref Range    TSH 1.09 0.30 - 4.20 uIU/mL   Troponin T, High Sensitivity     Status: Abnormal   Result Value Ref Range    Troponin T, High Sensitivity 24 (H) <=14 ng/L   Nt probnp inpatient (BNP)     Status: Normal   Result Value Ref Range    N terminal Pro BNP Inpatient 856 0 - 900 pg/mL   CBC with platelets and differential     Status: Abnormal   Result Value Ref Range    WBC Count 5.5 4.0 - 11.0 10e3/uL    RBC Count 6.11 (H) 3.80 - 5.20 10e6/uL    Hemoglobin 15.5 11.7 - 15.7 g/dL    Hematocrit 49.4 (H) 35.0 - 47.0 %    MCV 81 78 - 100 fL    MCH 25.4 (L) 26.5 - 33.0 pg    MCHC 31.4 (L) 31.5 - 36.5 g/dL    RDW 14.9 10.0 - 15.0 %    Platelet Count 152 150 - 450 10e3/uL    % Neutrophils 58 %    % Lymphocytes 29 %    % Monocytes 10 %    % Eosinophils 1 %    % Basophils 1 %    % Immature Granulocytes 1 %    NRBCs per 100 WBC 0 <1 /100    Absolute Neutrophils 3.2 1.6 - 8.3 10e3/uL    Absolute Lymphocytes 1.6 0.8 - 5.3 10e3/uL    Absolute Monocytes 0.6 0.0 - 1.3 10e3/uL    Absolute Eosinophils 0.1 0.0 - 0.7 10e3/uL    Absolute Basophils 0.0 0.0 - 0.2 10e3/uL    Absolute Immature Granulocytes 0.0 <=0.4 10e3/uL    Absolute NRBCs 0.0 10e3/uL   iStat INR, POCT     Status: Abnormal   Result Value Ref Range    INR POCT 1.1 (L) 2.0 - 3.0   iStat Troponin, POCT     Status: Normal   Result Value Ref Range    TROPPC POCT 0.01 <=0.12 ug/L   EKG 12 lead     Status: None   Result Value Ref Range    Systolic Blood Pressure  mmHg    Diastolic Blood Pressure  mmHg    Ventricular Rate 156 BPM    Atrial Rate 150 BPM    TN Interval  ms    QRS Duration 92 ms     ms    QTc 512 ms    P Axis   degrees    R AXIS 25 degrees    T Axis 211 degrees    Interpretation ECG       Atrial fibrillation with rapid ventricular response  ST & T wave abnormality, consider lateral ischemia  Abnormal ECG  Unconfirmed report - interpretation of this ECG is computer generated - see medical record for final interpretation  Confirmed by - EMERGENCY ROOM, PHYSICIAN ([id number]),  JACLYN ARCHIBALD ([id number]) on 2023 12:29:36 PM     Echo Complete     Status: None   Result Value Ref Range    LVEF  70%     Narrative    [id number]  [id number]  [id number]  [id number]^IMER^NOEL^ANT     [facility],[place]  Echocardiography Laboratory  [address] [place] [id number]     Name: YOCASTA ELAINE  MRN: [id number]  : 1955  Study Date: 2023 03:16 PM  Age: 68 yrs  Gender: Female  Patient Location: [facility]  Reason For Study: Atrial Fibrillation  Ordering Physician: NOEL WILLAMS  Performed By: Latonya Cardenas     BSA: 1.9 m2  Height: 64 in  Weight: 198 lb  ______________________________________________________________________________  Procedure  Complete Portable Echo Adult. Contrast Optison. Optison (NDC #4630-8128-61)  given intravenously. Patient was given 6 ml mixture of 3 ml Optison and 6 ml  saline. 3 ml wasted.  ______________________________________________________________________________  Interpretation Summary  Global and regional left ventricular function is hyperkinetic with an EF >70%.  Right ventricular function, chamber size, wall motion, and thickness are  normal.  Pulmonary artery systolic pressure is normal.  The inferior vena cava is normal.  No pericardial effusion is present.  ______________________________________________________________________________  Left Ventricle  Global and regional left ventricular function is hyperkinetic with an EF >70%.  Left ventricular size is normal. Mild concentric wall thickening consistent  with left ventricular hypertrophy is present. Diastolic  function not assessed  due to atrial fibrillation. No regional wall motion abnormalities are seen.     Right Ventricle  Right ventricular function, chamber size, wall motion, and thickness are  normal.     Atria  Both atria appear normal. Severe left atrial enlargement is present.     Mitral Valve  The mitral valve is normal.     Aortic Valve  Trileaflet aortic sclerosis without stenosis.     Tricuspid Valve  The tricuspid valve is normal. Trace tricuspid insufficiency is present. The  right ventricular systolic pressure is approximated at 23.9 mmHg plus the  right atrial pressure. Pulmonary artery systolic pressure is normal.     Pulmonic Valve  The pulmonic valve is normal.     Vessels  The thoracic aorta is normal. The pulmonary artery and bifurcation cannot be  assessed. The inferior vena cava is normal.     Pericardium  No pericardial effusion is present.  ______________________________________________________________________________  MMode/2D Measurements & Calculations  IVSd: 1.3 cm     LVIDd: 4.1 cm  LVIDs: 2.9 cm  LVPWd: 1.4 cm  FS: 29.7 %  LV mass(C)d: 206.4 grams  LV mass(C)dI: 106.0 grams/m2  Ao root diam: 2.6 cm  asc Aorta Diam: 3.3 cm  LVOT diam: 2.0 cm  LVOT area: 3.1 cm2  LA Volume (BP): 94.8 ml  LA Volume Index (BP): 48.6 ml/m2  RWT: 0.66     Doppler Measurements & Calculations  TR max joanna: 244.3 cm/sec  TR max P.9 mmHg     ______________________________________________________________________________  Report approved by: Diandra Mcintyre 2023 04:09 PM         CBC with platelets differential     Status: Abnormal    Narrative    The following orders were created for panel order CBC with platelets differential.  Procedure                               Abnormality         Status                     ---------                               -----------         ------                     CBC with platelets and d...[[id number]]  Abnormal            Final result                 Please view results  for these tests on the individual orders.
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
## 2023-06-19 NOTE — ED NOTES
Bed: ED03  Expected date:   Expected time:   Means of arrival:   Comments:  N712  68F  A fib RVR  Triaged yellow
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
## 2023-06-19 NOTE — CONSULTS
[facility]    Cardiology EP Consult Note      Date of Admission:  6/19/2023  Consult Requested by:Dr. Green  Reason for Consult: Atrial fibrillation on RVR    Assessment & Plan: SL   Ariana Palomino is a 68 year old female with PMHx of permanent atrial fibrillation with RVR and left ventricular dysfunction EF 30-35% who was referred to us for management of atrial fibrillation with RVR    In brief, patient had been doing well until earlier this morning. She states developing atrial fibrillation with RVR after she had an stressful event at work. Then she decided to come to the ED for further evaluation. Initial vitals with HR in the 150-160s, rhythm irregularly irregular and SBP in the 120s. I spoke to the patient and she denied chest pain or SOB. On physical exam she appears to be euvolemic.     Regarding her hx of atrial fibrillation, she has hx of permanent Afib on rate control with metoprolol and stroke prophylaxis with xarelto. She recently was admitted for dofetelide initiation for rhythm control but medication had to be discontinued due to side effects. At that time pacemaker implantation was considered so she can be safely initiated  on antiarrhythmics but patient wanted to have more time to think about it.     She also has hx of junctional rhythm and bradycardia while being on modest doses of BB.     For her Afib with RVR, we would recommend given one dose of IV metoprolol. Then admit to cardiology service for observation and consideration of pacer implantation tomorrow morning.      1. Atrial fibrillation with RVR  2. Pemanent atrial fibrillation  3. Hx of junctional rhythm  4. Hx of bradycardia  5. Tachy mayank syndrome  -admit to general cardiology service  -order one dose of IV metoprolol 5 mg now (ordered for you)  -restart PTA metoprolol  -restart PTA xarelto  -order TTE  -NPO midnight  -Will consider PPM implantation during this admission.       Addendum:  Initially we had planned to admit patient to the cardiology service for possible pacemaker implantation as detailed above. However, when we explained the plan to the patient she declined this option. We explained the risks of having tachy mayank syndrome and uncontrolled atrial fibrillation but she said that she needed more time to think about it. We also offered a close follow up with our EP team this week but she said that she just want to have her already scheduled follow up in couple of months with Dr. Kristina Arizmendi. At this point, it is ok to discharge patient, HR are better control now in the 110 to 130 s after IV metoprolol 5 mg. We think that is not prudent to increase her BB as she has high risk for severe bradycardia. Thus we would recommend continuing same medication regimen.      The patient's care was discussed with the Attending Physician, Dr. Arizmendi.      Jorge Reyes Castro, MD  [facility]    Clinically Significant Risk Factors Present on Admission               # Drug Induced Coagulation Defect: home medication list includes an anticoagulant medication    # Hypertension: Noted on problem list  # Chronic heart failure with reduced ejection fraction: last echo with EF <40%    # DMII: A1C = N/A within past 6 months              History of Present Illness   Ariana Palomino is a 68 year old female with PMHx of permanent atrial fibrillation with RVR, left ventricular dysfunction EF 30-35%, DM2  Who was referred to us for afib management.     I spoke to the patient and she tells me that she was in her usual state of health until this morning when she had an stressful event at work. After this, she started to feel sick. No chest pain or SOB. Found to be on afib with RVR in the ED. HR in the 150s.     Past Medical History    No past medical history on file.    Past Surgical History   Past Surgical History:   Procedure Laterality Date     ANESTHESIA CARDIOVERSION N/A  11/8/2022    Procedure: ANESTHESIA, FOR CARDIOVERSION@1130;  Surgeon: GENERIC ANESTHESIA PROVIDER;  Location: [facility] OR       Medications   I have reviewed this patient's current medications    Physical Exam   Vital Signs: Temp: 97.8  F (36.6  C) Temp src: Oral BP: (!) 141/118 Pulse: (!) 159   Resp: 19 SpO2: 97 % O2 Device: None (Room air)    Weight: 0 lbs 0 oz    Constitutional: High BMI. Looks sick   Eyes: Lids and lashes normal, pupils equal, round and reactive to light, extra ocular muscles intact, sclera clear, conjunctiva normal  ENT: Normocephalic, without obvious abnormality, atraumatic.  Hematologic / Lymphatic: no supraclavicular lymphadenopathy  Respiratory: No increased work of breathing, good air exchange, clear to auscultation bilaterally, No crackles or wheezing  Cardiovascular: irregularly irregular rhythm. No JVD. No peripheral leg swelling  GI: No scars, normal bowel sounds, soft, non-distended, non-tender.  Skin: warm to touch. No signs of excoriations in extremities*  Musculoskeletal: There is no redness, warmth, or swelling of the joints.  Full range of motion noted.  Neurologic: Awake, alert, oriented to name, place and time.  Cranial nerves II-XII are grossly intact.           Medical Decision Making       Please see A&P for additional details of medical decision making.      Data   ------------------------- PAST 24 HR DATA REVIEWED -----------------------------------------------

## 2023-06-29 ENCOUNTER — LAB REQUISITION (OUTPATIENT)
Dept: LAB | Facility: CLINIC | Age: 68
End: 2023-06-29
Payer: COMMERCIAL

## 2023-06-29 DIAGNOSIS — E11.65 TYPE 2 DIABETES MELLITUS WITH HYPERGLYCEMIA (H): ICD-10-CM

## 2023-06-29 DIAGNOSIS — I10 ESSENTIAL (PRIMARY) HYPERTENSION: ICD-10-CM

## 2023-06-29 LAB
ANION GAP SERPL CALCULATED.3IONS-SCNC: 12 MMOL/L (ref 7–15)
BUN SERPL-MCNC: 14.1 MG/DL (ref 8–23)
CALCIUM SERPL-MCNC: 9.4 MG/DL (ref 8.8–10.2)
CHLORIDE SERPL-SCNC: 103 MMOL/L (ref 98–107)
CREAT SERPL-MCNC: 0.97 MG/DL (ref 0.51–0.95)
DEPRECATED HCO3 PLAS-SCNC: 23 MMOL/L (ref 22–29)
GFR SERPL CREATININE-BSD FRML MDRD: 63 ML/MIN/1.73M2
GLUCOSE SERPL-MCNC: 221 MG/DL (ref 70–99)
POTASSIUM SERPL-SCNC: 4.7 MMOL/L (ref 3.4–5.3)
SODIUM SERPL-SCNC: 138 MMOL/L (ref 136–145)

## 2023-06-29 PROCEDURE — 80048 BASIC METABOLIC PNL TOTAL CA: CPT | Mod: ORL | Performed by: INTERNAL MEDICINE

## 2023-10-12 ENCOUNTER — OFFICE VISIT (OUTPATIENT)
Dept: CARDIOLOGY | Facility: CLINIC | Age: 68
End: 2023-10-12
Payer: COMMERCIAL

## 2023-10-12 VITALS
OXYGEN SATURATION: 100 % | HEART RATE: 54 BPM | DIASTOLIC BLOOD PRESSURE: 82 MMHG | BODY MASS INDEX: 34.06 KG/M2 | WEIGHT: 198.4 LBS | SYSTOLIC BLOOD PRESSURE: 193 MMHG

## 2023-10-12 DIAGNOSIS — I48.0 PAROXYSMAL ATRIAL FIBRILLATION (H): Primary | ICD-10-CM

## 2023-10-12 DIAGNOSIS — R00.1 SINUS BRADYCARDIA: ICD-10-CM

## 2023-10-12 PROCEDURE — 93000 ELECTROCARDIOGRAM COMPLETE: CPT

## 2023-10-12 PROCEDURE — 99215 OFFICE O/P EST HI 40 MIN: CPT

## 2023-10-12 RX ORDER — AMLODIPINE BESYLATE 10 MG/1
10 TABLET ORAL DAILY
Qty: 90 TABLET | Refills: 1 | Status: SHIPPED | OUTPATIENT
Start: 2023-10-12

## 2023-10-12 RX ORDER — AMLODIPINE BESYLATE 5 MG/1
10 TABLET ORAL DAILY
COMMUNITY
End: 2023-10-12 | Stop reason: DRUGHIGH

## 2023-10-12 NOTE — PROGRESS NOTES
ELECTROPHYSIOLOGY CLINIC VISIT    Assessment/Recommendations   Assessment/Plan:    Ariana Palomino is a 68 year old female with past medical history significant for atrial fibrillation, hypertension and type II diabetes.     Paroxysmal Atrial Fibrillation  We discussed in detail with the patient management/treatment options for A.fib includin. Stroke Prophylaxis:  CHADSVASC= 4 +age, +female +hypertension, +DM2  4, corresponding to a 4.0% annual stroke / systemic emolism event rate. indicating need for long term oral anticoagulation. Continue Xarelto 20 mg daily.   2. Rate Control: Continue metoprolol succinate 25 mg daily.   3. Rhythm Control: Failed dofetilide initiation due to long QT. Baseline bradycardia, ~50 bpm. ER visit 23, for atrial fibrillation, given metoprolol discharged in A fib, converted a couple days later. Rate 55 bpm at PCP follow up . Discussed that if she develops more frequent A fib episodes that don't resolve on there own, we will need to consider pacemaker implant for initiation of AAT. We also discussed that if she goes into A fib, she does not have to go to the ER unless the episode does not self resolve in 48 hours, she can notify us via [employer] and we can assist. She voices understanding of this plan.     Hypertension   PCP Dr Arnaud Craig managing, recently started amlodipine 5 mg daily on 23. BP today remains elevated, 190/80. Will increase to amlodipine 10 mg (two tabs) daily.     Increase amlodipine to 10 mg daily.   Follow up in 3 months.      History of Present Illness/Subjective    Ms. Ariana Palomino is a 68 year old female who comes in today for EP follow-up of atrial fibrillation.    Ariana Palomino is a 68 year old female with past medical history significant for atrial fibrillation, hypertension and type II diabetes.     Ms Palomino was initially diagnosed with atrial fibrillation when she presented to the ER 10/25/2022 with 3 weeks of palpitations and dyspnea. Found to be  in AF with rates 110 bpm and /105.  She was started on metoprolol XL 25 mg every day, started Xarelto daily, and underwent an outpatient MARY/CV 11/8/2022 to sinus bradycardia. She was told to stop metoprolol after the CV due to sinus mayank in the 40s. She said it lasted a day before she had recurrent palpitations and dyspnea with climbing stairs. She then saw Dr ROBERT Arizmendi 1/3/23, at this time she was in A fib 137 bpm. Dr Arizmendi resumed metoprolol succinate 50 every day, saw her back in follow up to assess compliance, HR improved to 115 bpm and she was compliant with Xarelto, AZEEM was 53. At follow up apt, they discussed maintaining sinus would require AAT. Due to baseline bradycardia, hospital admission was arranged for dofetilide initiation.     She came to hospital 3/6/2023 in AF at 96 bpm, QTc was 497 ms, GFR was 55, dofetilide was started at 250 mcg bid. She converted to sinus mayank/junctional mayank at 47 bpm with QTc 580 ms after one dose of dofetilide. She also tested positive for COVID although she was asymptomatic. Dofetilide was stopped and she was discharge on 3/8/2023, not on metoprolol or dofetilide. Saw her PCP in follow up who restarted metoprolol succinate 50 mg daily, later decrased to 25 mg daily.     She was seen by Dr ROBERT Arizmendi for follow up 4/11/23, at this time, she had been maintained sinus 40-50 bpm on metoprolol succinate 25 mg daily. They discussed that if she has recurrent symptomatic AF and requires antiarrhythmic drugs, will likely need a pacemaker implant.     She had recent ER visit on 6/19/23 with irregular heart beat. ECG confirmed A fib 155 bpm. Appears while in ER heart rate ranged from  bpm. No symptoms aside from irregular heart rate. Echo done with LVEF >70%, AZEEM 48. She did not want to be admitted or started on any medications. She was discharged from ER without med changes.     She presents today for follow up. She reports after discharge from the ER she went back into  sinus rhythm within a couple of days. HR at PCP visit on 6/29 55 bpm, presumably sinus. With A fib she notes palpitations and some mild dyspnea on exertion. She has not noted any of these symptoms since the ER visit. She denies chest discomfort, peripheral edema, shortness of breath, lightheadedness, pre-syncope, or syncope. Presenting 12 lead ECG shows sinus Vent Rate 53 bpm,  ms, QRS 98 ms, QTc 472 ms.     I have reviewed and updated the patient's Past Medical History, Social History, Family History and Medication List.     Cardiographics (Personally Reviewed) :   Echo: 6/19/2023   Interpretation Summary  Global and regional left ventricular function is hyperkinetic with an EF >70%.  Right ventricular function, chamber size, wall motion, and thickness are  normal.  Severe left atrial enlargement, AZEEM 48.   Pulmonary artery systolic pressure is normal.  The inferior vena cava is normal.  No pericardial effusion is present.    EKG:   TODAY 10/12/23 sinus 53 bpm,  LVH with repolarization abnormality  6/19/23:  bpm (ER visit)  4/11/2023: sinus 49 bpm, LVH with repolarization abnormality;  ms  3/7/2023: sinus mayank 43 bpm,  ms  2/14/2023:  bpm, QTc 450 ms  1/10/2023:  bpm  11/8/2022:  bpm (pre CV)  11/8/2022: sinus mayank 46 bpm with PACs (post CV)       Physical Examination   BP (!) 193/82 (BP Location: Right arm, Patient Position: Sitting, Cuff Size: Adult Large)   Pulse 54   Wt 90 kg (198 lb 6.4 oz)   SpO2 100%   BMI 34.06 kg/m    Wt Readings from Last 3 Encounters:   10/12/23 90 kg (198 lb 6.4 oz)   04/11/23 90 kg (198 lb 6.4 oz)   03/07/23 89.9 kg (198 lb 4.8 oz)     General Appearance:   Alert, well-appearing and in no acute distress.   HEENT: Atraumatic, normocephalic. MMM.   Chest/Lungs:   Respirations unlabored.  Lungs are clear to auscultation.   Cardiovascular:   Regular rate and rhythm.  S1/S2. No murmur.    Abdomen:  Soft, nontender, nondistended.    Extremities: No cyanosis or clubbing. No edema.    Musculoskeletal: Moves all extremities.     Skin: Warm, dry, intact.    Neurologic: Mood and affect are appropriate.  Alert and oriented to person, place, time, and situation.          Medications  Allergies   Lisinopril 40 mg daily   Toprol XL 25 mg daily   Xarelto 20 mg daily   Amlodipine 5 mg     Jardiance 25 mg daily   Metformin  No Known Allergies      Lab Results (Personally Reviewed)    Chemistry/lipid CBC Cardiac Enzymes/BNP/TSH/INR   Lab Results   Component Value Date    BUN 14.1 06/29/2023     06/29/2023    CO2 23 06/29/2023     Creatinine   Date Value Ref Range Status   06/29/2023 0.97 (H) 0.51 - 0.95 mg/dL Final       Lab Results   Component Value Date    CHOL 156 03/21/2023    HDL 53 03/21/2023    LDL 87 03/21/2023      Lab Results   Component Value Date    WBC 5.5 06/19/2023    HGB 15.5 06/19/2023    HCT 49.4 (H) 06/19/2023    MCV 81 06/19/2023     06/19/2023    Lab Results   Component Value Date    TSH 1.09 06/19/2023    INR 1.1 (L) 06/19/2023        The patient states understanding and is agreeable with the plan.     [place] PA-C  [facility]  Electrophysiology Consult Service  Pager: [phone]    I spent a total of 20 minutes face to face with Ariana Palomino during today's office visit. I have spent an additional 25 minutes today on chart review and documentation.

## 2023-10-12 NOTE — NURSING NOTE
"Chief Complaint   Patient presents with    Follow Up     Reason for the visit: Return EP       Initial BP (!) 193/82 (BP Location: Right arm, Patient Position: Sitting, Cuff Size: Adult Large)   Pulse 54   Wt 90 kg (198 lb 6.4 oz)   SpO2 100%   BMI 34.06 kg/m   Estimated body mass index is 34.06 kg/m  as calculated from the following:    Height as of 3/6/23: 1.626 m (5' 4\").    Weight as of this encounter: 90 kg (198 lb 6.4 oz)..  BP completed using cuff size: SHALONDA Gomez    "

## 2023-10-12 NOTE — PATIENT INSTRUCTIONS
Take your medicines every day, as directed     Changes made today:  Increase amlodipine to 10 mg once daily         Cardiology Care Coordinators:      Tamy HAYWOOD RN     Cardiology Rooming staff:  Kane CARPENTER      Phone  [phone]      Fax [phone]    To Contact us     During Business Hours:  [phone]     If you are needing refills please contact your pharmacy.     For urgent after hour care please call the [facility] at [phone] or the [facility] at [phone] and ask to speak to the cardiologist on call.            HOW TO CHECK YOUR BLOOD PRESSURE AT HOME:     Avoid eating, smoking, and exercising for at least 30 minutes before taking a reading.     Be sure you have taken your BP medication at least 2-3 hours before you check it.      Sit quietly for 10 minutes before a reading.      Sit in a chair with your feet flat on the floor. Rest your  arm on a table so that the arm cuff is at the same level as your heart.     Remain still during the reading.  Record your blood pressure and pulse in a log and bring to your next appointment.       Use [employer] allows you to communicate directly with your heart team through secure messaging.  [employer] can be accessed any time on your phone, computer, or tablet.  If you need assistance, we'd be happy to help!             Keep your Heart Appointments:     Follow-up in 3 months

## 2024-01-11 ENCOUNTER — OFFICE VISIT (OUTPATIENT)
Dept: CARDIOLOGY | Facility: CLINIC | Age: 69
End: 2024-01-11
Payer: COMMERCIAL

## 2024-01-11 VITALS
BODY MASS INDEX: 34.66 KG/M2 | OXYGEN SATURATION: 99 % | DIASTOLIC BLOOD PRESSURE: 66 MMHG | SYSTOLIC BLOOD PRESSURE: 168 MMHG | HEART RATE: 54 BPM | WEIGHT: 201.9 LBS

## 2024-01-11 DIAGNOSIS — I48.0 PAROXYSMAL ATRIAL FIBRILLATION (H): Primary | ICD-10-CM

## 2024-01-11 PROCEDURE — 99214 OFFICE O/P EST MOD 30 MIN: CPT

## 2024-01-11 PROCEDURE — 93000 ELECTROCARDIOGRAM COMPLETE: CPT

## 2024-01-11 ASSESSMENT — PAIN SCALES - GENERAL: PAINLEVEL: NO PAIN (0)

## 2024-01-11 NOTE — PROGRESS NOTES
ELECTROPHYSIOLOGY CLINIC VISIT    Assessment/Recommendations   Assessment/Plan:    Ariana Palomino is a 68 year old female with past medical history significant for atrial fibrillation, hypertension and type II diabetes.     Paroxysmal Atrial Fibrillation  We discussed in detail with the patient management/treatment options for A.fib includin. Stroke Prophylaxis:  CHADSVASC= 4 +age, +female +hypertension, +DM2  4, corresponding to a 4.0% annual stroke / systemic emolism event rate. indicating need for long term oral anticoagulation. Continue Xarelto 20 mg daily.   2. Rate Control: Continue metoprolol succinate 25 mg daily.   3. Rhythm Control: Failed dofetilide initiation due to long QT. Baseline bradycardia, ~50 bpm. ER visit 23, for atrial fibrillation, given metoprolol discharged in A fib, converted a couple days later. Rate 55 bpm at PCP follow up . Discussed that if she develops more frequent A fib episodes that don't resolve on there own, we will need to consider pacemaker implant for initiation of AAT. We also discussed that if she goes into A fib, she does not have to go to the ER unless the episode does not self resolve in 48 hours, she can notify us via [employer] and we can assist. She voices understanding of this plan.     Hypertension   PCP Dr Arnaud Craig managing, recently started amlodipine 5 mg daily on 23. At last visit I increased this to 10 mg daily. BP today remains elevated, 168/66. She has not been taking blood pressures at home, no documentation of blood pressure since our last visit. I asked her to take it at home, she has a cuff, after she takes her medicine. She is due for follow up with PCP, can rediscuss starting additional agent if needed.     Follow up in 6 months.      History of Present Illness/Subjective    MsChasity Palomino is a 68 year old female who comes in today for EP follow-up of atrial fibrillation.    Ariana Palomino is a 68 year old female with past medical history  significant for atrial fibrillation, hypertension and type II diabetes.     Ms Palomino was initially diagnosed with atrial fibrillation when she presented to the ER 10/25/2022 with 3 weeks of palpitations and dyspnea. Found to be in AF with rates 110 bpm and /105.  She was started on metoprolol XL 25 mg every day, started Xarelto daily, and underwent an outpatient MARY/CV 11/8/2022 to sinus bradycardia. She was told to stop metoprolol after the CV due to sinus mayank in the 40s. She said it lasted a day before she had recurrent palpitations and dyspnea with climbing stairs. She then saw Dr ROBERT Arizmendi 1/3/23, at this time she was in A fib 137 bpm. Dr Arizmendi resumed metoprolol succinate 50 every day, saw her back in follow up to assess compliance, HR improved to 115 bpm and she was compliant with Xarelto, AZEEM was 53. At follow up apt, they discussed maintaining sinus would require AAT. Due to baseline bradycardia, hospital admission was arranged for dofetilide initiation.     She came to hospital 3/6/2023 in AF at 96 bpm, QTc was 497 ms, GFR was 55, dofetilide was started at 250 mcg bid. She converted to sinus mayank/junctional mayank at 47 bpm with QTc 580 ms after one dose of dofetilide. She also tested positive for COVID although she was asymptomatic. Dofetilide was stopped and she was discharge on 3/8/2023, not on metoprolol or dofetilide. Saw her PCP in follow up who restarted metoprolol succinate 50 mg daily, later decrased to 25 mg daily.     She was seen by Dr ROBERT Arizmendi for follow up 4/11/23, at this time, she had been maintained sinus 40-50 bpm on metoprolol succinate 25 mg daily. They discussed that if she has recurrent symptomatic AF and requires antiarrhythmic drugs, will likely need a pacemaker implant.     She had recent ER visit on 6/19/23 with irregular heart beat. ECG confirmed A fib 155 bpm. Appears while in ER heart rate ranged from  bpm. No symptoms aside from irregular heart rate. Echo done with  LVEF >70%, AZEEM 48. She did not want to be admitted or started on any medications. She was discharged from ER without med changes.     EP Visit 10/12/23: She presents today for follow up. She reports after discharge from the ER she went back into sinus rhythm within a couple of days. HR at PCP visit on 6/29 55 bpm, presumably sinus. With A fib she notes palpitations and some mild dyspnea on exertion. She has not noted any of these symptoms since the ER visit. She denies chest discomfort, peripheral edema, shortness of breath, lightheadedness, pre-syncope, or syncope. Presenting 12 lead ECG shows sinus Vent Rate 53 bpm,  ms, QRS 98 ms, QTc 472 ms.     She presents today for follow up. She has been feeling well, she has not had any symptoms of recurrent A fib including dyspnea or palpitations/heart racing. BP elevated today, she has not been monitoring her blood pressure at home. She otherwise has no new cardiac concerns today, denies chest discomfort, palpitations, peripheral edema, shortness of breath, pre-syncope, or syncope.     I have reviewed and updated the patient's Past Medical History, Social History, Family History and Medication List.     Cardiographics (Personally Reviewed) :   Echo: 6/19/2023   Interpretation Summary  Global and regional left ventricular function is hyperkinetic with an EF >70%.  Right ventricular function, chamber size, wall motion, and thickness are  normal.  Severe left atrial enlargement, AZEEM 48.   Pulmonary artery systolic pressure is normal.  The inferior vena cava is normal.  No pericardial effusion is present.    EKG:   TODAY 1/11/24 sinus 58 bpm, Qtc 463 ms LVH  10/12/23 sinus 53 bpm,  LVH with repolarization abnormality  6/19/23:  bpm (ER visit)  4/11/2023: sinus 49 bpm, LVH with repolarization abnormality;  ms  3/7/2023: sinus mayank 43 bpm,  ms  2/14/2023:  bpm, QTc 450 ms  1/10/2023:  bpm  11/8/2022:  bpm (pre CV)  11/8/2022: sinus  mayank 46 bpm with PACs (post CV)       Physical Examination   BP (!) 168/66 (BP Location: Left arm, Patient Position: Sitting, Cuff Size: Adult Large)   Pulse 54   Wt 91.6 kg (201 lb 14.4 oz)   SpO2 99%   BMI 34.66 kg/m    Wt Readings from Last 3 Encounters:   01/11/24 91.6 kg (201 lb 14.4 oz)   10/12/23 90 kg (198 lb 6.4 oz)   04/11/23 90 kg (198 lb 6.4 oz)     General Appearance:   Alert, well-appearing and in no acute distress.   HEENT: Atraumatic, normocephalic. MMM.   Chest/Lungs:   Respirations unlabored.  Lungs are clear to auscultation.   Cardiovascular:   Regular rate and rhythm.  S1/S2. No murmur.    Abdomen:  Soft, nontender, nondistended.   Extremities: No cyanosis or clubbing. No edema.    Musculoskeletal: Moves all extremities.     Skin: Warm, dry, intact.    Neurologic: Mood and affect are appropriate.  Alert and oriented to person, place, time, and situation.          Medications  Allergies   Lisinopril 40 mg daily   Toprol XL 25 mg daily   Xarelto 20 mg daily   Amlodipine 10 mg     Jardiance 25 mg daily   Metformin    No Known Allergies      Lab Results (Personally Reviewed)    Chemistry/lipid CBC Cardiac Enzymes/BNP/TSH/INR   Lab Results   Component Value Date    BUN 14.1 06/29/2023     06/29/2023    CO2 23 06/29/2023     Creatinine   Date Value Ref Range Status   06/29/2023 0.97 (H) 0.51 - 0.95 mg/dL Final       Lab Results   Component Value Date    CHOL 156 03/21/2023    HDL 53 03/21/2023    LDL 87 03/21/2023      Lab Results   Component Value Date    WBC 5.5 06/19/2023    HGB 15.5 06/19/2023    HCT 49.4 (H) 06/19/2023    MCV 81 06/19/2023     06/19/2023    Lab Results   Component Value Date    TSH 1.09 06/19/2023    INR 1.1 (L) 06/19/2023        The patient states understanding and is agreeable with the plan.     [place] PA-C  [facility]  Electrophysiology Consult Service  Pager: [phone]    I spent a total of 20 minutes face to face with Ariana Palomino  during today's office visit. I have spent an additional 15 minutes today on chart review and documentation.

## 2024-01-11 NOTE — PATIENT INSTRUCTIONS
Take your medicines every day, as directed     Changes made today:  No medication changes        Cardiology Care Coordinators:      Tamy SANTIZO RN     Cardiology Rooming staff:  Carissa LOZANO CNA    Phone  [phone]      Fax [phone]    To Contact us     During Business Hours:  [phone]     If you are needing refills please contact your pharmacy.     For urgent after hour care please call the [facility] at [phone] or the [facility] at [phone] and ask to speak to the cardiologist on call.            HOW TO CHECK YOUR BLOOD PRESSURE AT HOME:     Avoid eating, smoking, and exercising for at least 30 minutes before taking a reading.     Be sure you have taken your BP medication at least 2-3 hours before you check it.      Sit quietly for 10 minutes before a reading.      Sit in a chair with your feet flat on the floor. Rest your  arm on a table so that the arm cuff is at the same level as your heart.     Remain still during the reading.  Record your blood pressure and pulse in a log and bring to your next appointment.       Use [employer] allows you to communicate directly with your heart team through secure messaging.  [employer] can be accessed any time on your phone, computer, or tablet.  If you need assistance, we'd be happy to help!             Keep your Heart Appointments:     Follow-up in 6 months

## 2024-01-11 NOTE — NURSING NOTE
Med Reconcile: Reviewed and verified all current medications with the patient. The updated medication list was printed and given to the patient./No medication changes.       Return Appointment:   -Follow-up in 6 months       Patient stated she understood all health information given and agreed to call with further questions or concerns.

## 2024-02-22 ENCOUNTER — LAB REQUISITION (OUTPATIENT)
Dept: LAB | Facility: CLINIC | Age: 69
End: 2024-02-22
Payer: COMMERCIAL

## 2024-02-22 ENCOUNTER — MEDICAL CORRESPONDENCE (OUTPATIENT)
Dept: HEALTH INFORMATION MANAGEMENT | Facility: CLINIC | Age: 69
End: 2024-02-22

## 2024-02-22 DIAGNOSIS — E78.00 PURE HYPERCHOLESTEROLEMIA, UNSPECIFIED: ICD-10-CM

## 2024-02-22 DIAGNOSIS — E11.65 TYPE 2 DIABETES MELLITUS WITH HYPERGLYCEMIA (H): ICD-10-CM

## 2024-02-22 LAB
ALBUMIN SERPL BCG-MCNC: 4.2 G/DL (ref 3.5–5.2)
ALP SERPL-CCNC: 70 U/L (ref 40–150)
ALT SERPL W P-5'-P-CCNC: 14 U/L (ref 0–50)
ANION GAP SERPL CALCULATED.3IONS-SCNC: 11 MMOL/L (ref 7–15)
AST SERPL W P-5'-P-CCNC: 20 U/L (ref 0–45)
BILIRUB SERPL-MCNC: 0.5 MG/DL
BUN SERPL-MCNC: 13 MG/DL (ref 8–23)
CALCIUM SERPL-MCNC: 8.9 MG/DL (ref 8.8–10.2)
CHLORIDE SERPL-SCNC: 102 MMOL/L (ref 98–107)
CHOLEST SERPL-MCNC: 179 MG/DL
CREAT SERPL-MCNC: 0.89 MG/DL (ref 0.51–0.95)
CREAT UR-MCNC: 148 MG/DL
DEPRECATED HCO3 PLAS-SCNC: 24 MMOL/L (ref 22–29)
EGFRCR SERPLBLD CKD-EPI 2021: 69 ML/MIN/1.73M2
FASTING STATUS PATIENT QL REPORTED: NO
GLUCOSE SERPL-MCNC: 240 MG/DL (ref 70–99)
HDLC SERPL-MCNC: 56 MG/DL
LDLC SERPL CALC-MCNC: 103 MG/DL
MICROALBUMIN UR-MCNC: <12 MG/L
MICROALBUMIN/CREAT UR: NORMAL MG/G{CREAT}
NONHDLC SERPL-MCNC: 123 MG/DL
POTASSIUM SERPL-SCNC: 4.1 MMOL/L (ref 3.4–5.3)
PROT SERPL-MCNC: 7.3 G/DL (ref 6.4–8.3)
SODIUM SERPL-SCNC: 137 MMOL/L (ref 135–145)
TRIGL SERPL-MCNC: 101 MG/DL

## 2024-02-22 PROCEDURE — 80061 LIPID PANEL: CPT | Mod: ORL | Performed by: INTERNAL MEDICINE

## 2024-02-22 PROCEDURE — 80053 COMPREHEN METABOLIC PANEL: CPT | Mod: ORL | Performed by: INTERNAL MEDICINE

## 2024-02-22 PROCEDURE — 82043 UR ALBUMIN QUANTITATIVE: CPT | Mod: ORL | Performed by: INTERNAL MEDICINE

## 2024-02-23 ENCOUNTER — TRANSCRIBE ORDERS (OUTPATIENT)
Dept: OTHER | Age: 69
End: 2024-02-23

## 2024-02-23 DIAGNOSIS — E11.65 UNCONTROLLED TYPE 2 DIABETES MELLITUS WITH HYPERGLYCEMIA (H): Primary | ICD-10-CM

## 2024-03-15 ENCOUNTER — LAB REQUISITION (OUTPATIENT)
Dept: LAB | Facility: CLINIC | Age: 69
End: 2024-03-15
Payer: COMMERCIAL

## 2024-03-15 DIAGNOSIS — Z12.11 ENCOUNTER FOR SCREENING FOR MALIGNANT NEOPLASM OF COLON: ICD-10-CM

## 2024-03-15 LAB — HEMOCCULT STL QL IA: NEGATIVE

## 2024-03-15 PROCEDURE — 82274 ASSAY TEST FOR BLOOD FECAL: CPT | Mod: ORL | Performed by: INTERNAL MEDICINE

## 2024-03-27 DIAGNOSIS — I10 ESSENTIAL HYPERTENSION: ICD-10-CM

## 2024-04-02 RX ORDER — LISINOPRIL 40 MG/1
TABLET ORAL
Qty: 90 TABLET | Refills: 0 | OUTPATIENT
Start: 2024-04-02

## 2024-04-02 NOTE — TELEPHONE ENCOUNTER
lisinopril (ZESTRIL) 40 MG tablet 90 tablet 3 3/8/2023       Last Office Visit: 1/11/24  Future Office visit:   7/11/24    ACE Inhibitors (Including Combos) Protocol [id number]/27/2024 09:49 AM   Protocol Details Blood pressure under 140/90 in past 12 months- Clinicial or Patient Reported     BP Readings from Last 3 Encounters:   01/11/24 (!) 168/66   10/12/23 (!) 193/82   06/19/23 (!) 139/91   Hypertension   PCP Dr Arnaud Craig managing, recently started amlodipine 5 mg daily on 6/29/23. At last visit I increased this to 10 mg daily. BP today remains elevated, 168/66. She has not been taking blood pressures at home, no documentation of blood pressure since our last visit. I asked her to take it at home, she has a cuff, after she takes her medicine. She is due for follow up with PCP, can rediscuss starting additional agent if needed.  Follow up in 6 months.     BP-135/87 3/20/24 Dr Mccoy  BP -135/66 3/13/24 Dr Mccoy      Routing refill request to provider for review/approval because:  Cardiology to refill or PCP office??    Cheryl Castillo RN  P Red Flag Triage/MRT

## 2024-04-02 NOTE — TELEPHONE ENCOUNTER
Spoke with pharmacist, pt still has 1 refill left for amlodipine 10 mg daily, so they are going to dispense that today, moving forward refill request will be forwarded to PCP/Dr Arnaud Arteaga at [facility] in [place]. Noted patient saw PCP there in February 2024 with plan to continue with amlodipine 10 mg daily and add hydrochlorothiazide 12.5 mg for better blood pressure control ( see clinic notes in Care everywhere).

## 2024-04-12 ENCOUNTER — MEDICAL CORRESPONDENCE (OUTPATIENT)
Dept: HEALTH INFORMATION MANAGEMENT | Facility: CLINIC | Age: 69
End: 2024-04-12

## 2024-04-17 ENCOUNTER — ANCILLARY PROCEDURE (OUTPATIENT)
Dept: MAMMOGRAPHY | Facility: CLINIC | Age: 69
End: 2024-04-17
Attending: INTERNAL MEDICINE
Payer: COMMERCIAL

## 2024-04-17 DIAGNOSIS — Z12.39 ENCOUNTER FOR SCREENING FOR MALIGNANT NEOPLASM OF BREAST: ICD-10-CM

## 2024-04-17 PROCEDURE — 77067 SCR MAMMO BI INCL CAD: CPT | Mod: GC | Performed by: RADIOLOGY

## 2024-04-17 PROCEDURE — 77063 BREAST TOMOSYNTHESIS BI: CPT | Mod: GC | Performed by: RADIOLOGY

## 2024-05-16 ENCOUNTER — ANCILLARY PROCEDURE (OUTPATIENT)
Dept: BONE DENSITY | Facility: CLINIC | Age: 69
End: 2024-05-16
Attending: INTERNAL MEDICINE
Payer: COMMERCIAL

## 2024-05-16 DIAGNOSIS — Z78.0 POSTMENOPAUSAL: ICD-10-CM

## 2024-05-16 PROCEDURE — 77080 DXA BONE DENSITY AXIAL: CPT | Performed by: INTERNAL MEDICINE

## 2024-09-03 ENCOUNTER — LAB REQUISITION (OUTPATIENT)
Dept: LAB | Facility: CLINIC | Age: 69
End: 2024-09-03

## 2024-09-03 DIAGNOSIS — E78.00 PURE HYPERCHOLESTEROLEMIA, UNSPECIFIED: ICD-10-CM

## 2024-09-03 LAB
CHOLEST SERPL-MCNC: 123 MG/DL
FASTING STATUS PATIENT QL REPORTED: NORMAL
HDLC SERPL-MCNC: 53 MG/DL
LDLC SERPL CALC-MCNC: 57 MG/DL
NONHDLC SERPL-MCNC: 70 MG/DL
TRIGL SERPL-MCNC: 64 MG/DL

## 2024-09-03 PROCEDURE — 82465 ASSAY BLD/SERUM CHOLESTEROL: CPT | Performed by: INTERNAL MEDICINE

## 2024-11-15 ENCOUNTER — TRANSCRIBE ORDERS (OUTPATIENT)
Dept: OTHER | Age: 69
End: 2024-11-15

## 2024-11-15 DIAGNOSIS — E11.65 UNCONTROLLED TYPE 2 DIABETES MELLITUS WITH HYPERGLYCEMIA (H): Primary | ICD-10-CM

## 2025-02-19 ENCOUNTER — LAB REQUISITION (OUTPATIENT)
Dept: LAB | Facility: CLINIC | Age: 70
End: 2025-02-19

## 2025-02-19 DIAGNOSIS — E11.65 TYPE 2 DIABETES MELLITUS WITH HYPERGLYCEMIA (H): ICD-10-CM

## 2025-02-19 LAB
CREAT UR-MCNC: 137 MG/DL
MICROALBUMIN UR-MCNC: <12 MG/L
MICROALBUMIN/CREAT UR: NORMAL MG/G{CREAT}

## 2025-02-19 PROCEDURE — 82570 ASSAY OF URINE CREATININE: CPT | Performed by: INTERNAL MEDICINE

## 2025-06-25 ENCOUNTER — TELEPHONE (OUTPATIENT)
Dept: CARDIOLOGY | Facility: CLINIC | Age: 70
End: 2025-06-25
Payer: COMMERCIAL

## 2025-06-25 NOTE — TELEPHONE ENCOUNTER
[facility] Call Center    Phone Message    May a detailed message be left on voicemail: yes     Reason for Call: Other: Dr rashid from [facility] would like a call back to discuss pt as she is in afib and has not been seen with cardio team in a year and so Dr Rashid would like to discuss pt as pt does not feel she needs to go to the E.R      Action Taken: Other: Cardio    Travel Screening: Not Applicable     Date of Service:

## 2025-06-26 NOTE — TELEPHONE ENCOUNTER
Situation  Recurrent Afib.  RN attempted to call Dr Craig as requested, no answer, message left to call back.       Upon chart review, it was noted patient was last seen in January 2024 with a plan for follow-up in six months. RN contacted  patient, who reported feeling at ease currently, with symptoms coming and going, and stated she does not believe she is in atrial fibrillation at this time. RN offered to schedule an appointment with electrophysiology, but the patient declined due to lack of health insurance, which she is currently working to resolve, though it may take some time.    RN advised the patient to seek emergency care if any recurrent episodes of atrial fibrillation persist over 48 hours. Patient is considering self-paying for a visit and inquired about the cost. RN directed pt to contact the Cost of Care team at [phone] for an estimate. Patient expressed understanding and has no further questions at this time.

## 2025-08-18 ENCOUNTER — LAB REQUISITION (OUTPATIENT)
Dept: LAB | Facility: CLINIC | Age: 70
End: 2025-08-18

## 2025-08-18 DIAGNOSIS — Z12.11 ENCOUNTER FOR SCREENING FOR MALIGNANT NEOPLASM OF COLON: ICD-10-CM

## 2025-08-18 PROCEDURE — 82274 ASSAY TEST FOR BLOOD FECAL: CPT | Performed by: INTERNAL MEDICINE

## 2025-08-19 LAB — HEMOCCULT STL QL IA: NEGATIVE

## (undated) RX ORDER — MAGNESIUM SULFATE HEPTAHYDRATE 40 MG/ML
INJECTION, SOLUTION INTRAVENOUS
Status: DISPENSED
Start: 2022-11-08

## (undated) RX ORDER — LIDOCAINE HYDROCHLORIDE 20 MG/ML
SOLUTION OROPHARYNGEAL
Status: DISPENSED
Start: 2022-11-08

## (undated) RX ORDER — FENTANYL CITRATE 50 UG/ML
INJECTION, SOLUTION INTRAMUSCULAR; INTRAVENOUS
Status: DISPENSED
Start: 2022-11-08